# Patient Record
Sex: FEMALE | Race: WHITE | Employment: OTHER | URBAN - METROPOLITAN AREA
[De-identification: names, ages, dates, MRNs, and addresses within clinical notes are randomized per-mention and may not be internally consistent; named-entity substitution may affect disease eponyms.]

---

## 2020-03-27 ENCOUNTER — TELEPHONE (OUTPATIENT)
Dept: CARDIOLOGY CLINIC | Age: 70
End: 2020-03-27

## 2020-03-27 NOTE — TELEPHONE ENCOUNTER
Pt is scheduled at the [facility] office 5/12/2020 to see Dr. Katiuska Escobar.    Niece would like for her to be seen sooner, she has no immediate problems. She has no access to cell phones, laptop, however she is calling today from her mother's cell phone. Anyway the patient would like to be seen in the office in [place]. I explained that we are not seeing patients in the office, but the niece is insisting that she be seen soon in the office. Please call sushant to figure out the best way for patient to be seen.     Thanks

## 2020-03-27 NOTE — TELEPHONE ENCOUNTER
Spoke to patient using 2 identifiers. Patient gave verbal permission to me to speak with her niece, Jackie Bunn. She wanted to know if patient can be seen sooner than appt date to establish care. Does not have any current urgent matter to be addressed. It was explained to her that d/t the coronavirus, our clinic is seeing patients via virtual visits. We are limiting patient's we see in the clinic on an urgent basis or if deemed necessary by providers. Since neither patient or niece has the means for virtual visits, she was informed to keep her appt for now on 5/12/20 with Dr. Dilma Euceda since current situation may be subject to change. She verbalized understanding.

## 2020-06-08 RX ORDER — METOPROLOL SUCCINATE 50 MG/1
50 TABLET, EXTENDED RELEASE ORAL DAILY
Status: ON HOLD | COMMUNITY
End: 2022-01-01

## 2020-06-08 RX ORDER — GLIPIZIDE 10 MG/1
10 TABLET, FILM COATED, EXTENDED RELEASE ORAL DAILY
Status: ON HOLD | COMMUNITY
End: 2022-01-01

## 2020-06-08 NOTE — PROGRESS NOTES
1. Have you been to the ER, urgent care clinic since your last visit? Hospitalized since your last visit? No    2. Have you seen or consulted any other health care providers outside of the [address] [place] [address] since your last visit? Include any pap smears or colon screening. No    Chief Complaint   Patient presents with    Chest Pain     NP, ref by Dr. Carloz De Guzman, have records. C/o CP rest and exertion . C/O lightheadedness.

## 2020-06-08 NOTE — PROGRESS NOTES
Subjective/HPI:     Prince Cox is a 68 y.o. female is here for new patient consultation. The patient has medical hx significant for HTN, Hypercholesterolemia, DM, and AF. The pt presents with c/o CP. Symptoms started 3 years ago and progressed to up to 3x/day on avg. It is not occurring daily. She will rest and it will resolve on its own. She denies other associated symptoms or noted triggers. Can occur during the day or at night. She also has episodes of lightheadedness that occurs when she has been standing for some time. She denies fatigue, diaphoresis, fatigue, nausea, or SALAS/SOB. She has hx of former tobacco use. Previous cardiac evaluation includes echo in 2009 with NL EF and no valvular disease. Family med hx significant for DM, cancer, CAD. Patient Active Problem List    Diagnosis Date Noted    Hypercholesteremia 07/15/2014    HTN (hypertension) 07/15/2014    Atrial fibrillation ([facility] 07/15/2014    Diabetes type 2, controlled ([facility] [facility] 07/15/2014      Mehnaz Castaneda MD  Past Medical History:   Diagnosis Date    Diabetes ([address]     Hypercholesterolemia     Hypertension       Past Surgical History:   Procedure Laterality Date    HX GYN      HX HYSTERECTOMY N/A      No Known Allergies   Family History   Problem Relation Age of Onset    Cancer Mother     Diabetes Mother     Heart Disease Mother     Diabetes Sister     Diabetes Sister     Diabetes Sister     Diabetes Sister     Cancer Sister       Current Outpatient Medications   Medication Sig    glipiZIDE SR (GLUCOTROL XL) 10 mg CR tablet Take 10 mg by mouth daily.  metoprolol succinate (TOPROL-XL) 50 mg XL tablet Take 50 mg by mouth daily.  dulaglutide (TRULICITY) 1.5 LI/0.5 mL sub-q pen 1.5 mg by SubCUTAneous route every seven (7) days.  metformin (GLUCOPHAGE) 500 mg tablet Take 1 tablet by mouth two (2) times daily (with meals).     glucose blood VI test strips (TeburuIA CONTOUR) strip Check blood glucose 2 times a day    lisinopril (PRINIVIL, ZESTRIL) 10 mg tablet Take 1 tablet by mouth daily.  Lancets (LANCETS,ULTRA THIN) misc Check glucose 2 times a day    simvastatin (ZOCOR) 20 mg tablet Take 1 Tab by mouth nightly.  ofloxacin (FLOXIN) 0.3 % otic solution Administer 5 Drops into each ear daily.  aspirin 81 mg chewable tablet Take 81 mg by mouth daily. No current facility-administered medications for this visit. Vitals:    06/09/20 1019 06/09/20 1034 06/09/20 1035   BP: 170/90 146/80 160/88   Pulse: 78     Resp: 18     Weight: 152 lb 4.8 oz (69.1 kg)     Height: 5' 0.5\" (1.537 m)         I have reviewed the nurses notes, vitals, problem list, allergy list, medical history, family, social history and medications. Review of Symptoms:  General: Pt denies excessive weight gain or loss. Pt is able to conduct ADL's  HEENT: Denies blurred vision, headaches, epistaxis and difficulty swallowing. Respiratory: Denies shortness of breath, SALAS, wheezing or stridor. Cardiovascular: +precordial pain, denies palpitations, edema or PND  Gastrointestinal: Denies poor appetite, indigestion, abdominal pain or blood in stool  Urinary: Denies dysuria, pyuria  Musculoskeletal: Denies pain or swelling from muscles or joints  Neurologic: +lightheadedness Denies tremor, paresthesias, or sensory motor disturbance  Skin: Denies rash, itching or texture change. Psych: Denies depression        Physical Exam:      General: Well developed, cooperative, alert in no acute distress, appears states age. HEENT: Supple, No carotid bruits, no JVD, trach is midline. PERRL, EOM intact  Heart:  Normal S1/S2 negative S3 or S4. Regular,+KANDICE, no gallop or rub. Respiratory: Clear bilaterally x 4, no wheezing or rales  Abdomen:   Soft, non-tender, no masses, bowel sounds are active. Extremities:  No edema, normal cap refill, no cyanosis, atraumatic. Neuro: A&Ox3, speech clear, gait stable. Skin: Skin color is normal. No rashes or lesions.  Non diaphoretic  Vascular: 2+ pulses symmetric in all extremities    Cardiographics    ECG: Sinus  Rhythm   -Poor R-wave progression         Cardiology Labs:    Lab Results   Component Value Date/Time    Cholesterol, total 216 (H) 07/15/2014 02:50 PM    HDL Cholesterol 70 07/15/2014 02:50 PM    LDL, calculated 96 07/15/2014 02:50 PM    LDL, calculated 117.2 (H) 08/13/2009 04:00 AM    VLDL, calculated 50 (H) 07/15/2014 02:50 PM    CHOL/HDL Ratio 3.8 08/13/2009 04:00 AM     Lab Results   Component Value Date/Time    Sodium 141 07/15/2014 02:50 PM    Potassium 5.0 07/15/2014 02:50 PM    Chloride 102 07/15/2014 02:50 PM    CO2 24 07/15/2014 02:50 PM    Glucose 157 (H) 07/15/2014 02:50 PM    BUN 18 07/15/2014 02:50 PM    Creatinine 0.78 07/15/2014 02:50 PM    BUN/Creatinine ratio 23 07/15/2014 02:50 PM    GFR est AA 92 07/15/2014 02:50 PM    GFR est non-AA 80 07/15/2014 02:50 PM    Calcium 9.9 07/15/2014 02:50 PM    Anion gap 10 08/14/2009 04:15 AM    Bilirubin, total 0.3 07/15/2014 02:50 PM    ALT (SGPT) 16 07/15/2014 02:50 PM    Alk. phosphatase 88 07/15/2014 02:50 PM    Protein, total 7.3 07/15/2014 02:50 PM    Albumin 4.9 (H) 07/15/2014 02:50 PM    Globulin 3.7 08/12/2009 11:55 AM    A-G Ratio 2.0 07/15/2014 02:50 PM     Lab Results   Component Value Date/Time    Hemoglobin A1c 9.4 (H) 08/13/2009 04:00 AM    Hemoglobin A1c (POC) 7.1% 07/15/2014 02:50 PM        Assessment:     Assessment:      Diagnoses and all orders for this visit:    1. Precordial pain  -     NUCLEAR CARDIAC STRESS TEST  -     ECHO ADULT COMPLETE; Future    2. Essential hypertension  -     NUCLEAR CARDIAC STRESS TEST  -     ECHO ADULT COMPLETE; Future    3. Hypercholesteremia  -     NUCLEAR CARDIAC STRESS TEST  -     ECHO ADULT COMPLETE; Future    4. Controlled type 2 diabetes mellitus without complication, unspecified whether long term insulin use (HCC)  -     NUCLEAR CARDIAC STRESS TEST  -     ECHO ADULT COMPLETE; Future    5.  Chest pain, unspecified type  -     AMB POC EKG ROUTINE W/ 12 LEADS, INTER & REP  -     NUCLEAR CARDIAC STRESS TEST  -     ECHO ADULT COMPLETE; Future      Specialty Problems        Cardiology Problems    Atrial fibrillation ([address]        Diabetes type 2, controlled ([address]        HTN (hypertension)        Hypercholesteremia              ICD-10-CM ICD-9-CM    1. Precordial pain R07.2 786.51 NUCLEAR CARDIAC STRESS TEST      ECHO ADULT COMPLETE   2. Essential hypertension I10 401.9 NUCLEAR CARDIAC STRESS TEST      ECHO ADULT COMPLETE   3. Hypercholesteremia E78.00 272.0 NUCLEAR CARDIAC STRESS TEST      ECHO ADULT COMPLETE   4. Controlled type 2 diabetes mellitus without complication, unspecified whether long term insulin use (HCC) E11.9 250.00 NUCLEAR CARDIAC STRESS TEST      ECHO ADULT COMPLETE   5. Chest pain, unspecified type R07.9 786.50 AMB POC EKG ROUTINE W/ 12 LEADS, INTER & REP      NUCLEAR CARDIAC STRESS TEST      ECHO ADULT COMPLETE         PLAN:  1. Precordial pain  Episodes of CP that resolve with rest. Evaluate with lexiscan stress test as she is not able to walk on treadmill. 2. Essential hypertension  Labile in PCP office, elevated today. Cont to monitor during testing. 3. Hypercholesteremia  , HDL 62, Trig 201. Not taking statin. Recommend take high intensity statin    4. Uncontrolled type 2 diabetes mellitus  Managed by PCP. Has not been well controlled. F/U dependent on results.       Leighann Ortiz MD

## 2020-06-09 ENCOUNTER — OFFICE VISIT (OUTPATIENT)
Dept: CARDIOLOGY CLINIC | Age: 70
End: 2020-06-09

## 2020-06-09 VITALS
DIASTOLIC BLOOD PRESSURE: 88 MMHG | SYSTOLIC BLOOD PRESSURE: 160 MMHG | BODY MASS INDEX: 28.75 KG/M2 | RESPIRATION RATE: 18 BRPM | HEIGHT: 61 IN | HEART RATE: 78 BPM | WEIGHT: 152.3 LBS

## 2020-06-09 DIAGNOSIS — R07.2 PRECORDIAL PAIN: Primary | ICD-10-CM

## 2020-06-09 DIAGNOSIS — E78.00 HYPERCHOLESTEREMIA: ICD-10-CM

## 2020-06-09 DIAGNOSIS — E11.9 CONTROLLED TYPE 2 DIABETES MELLITUS WITHOUT COMPLICATION, UNSPECIFIED WHETHER LONG TERM INSULIN USE (HCC): ICD-10-CM

## 2020-06-09 DIAGNOSIS — I10 ESSENTIAL HYPERTENSION: ICD-10-CM

## 2020-06-09 DIAGNOSIS — R07.9 CHEST PAIN, UNSPECIFIED TYPE: ICD-10-CM

## 2020-06-09 NOTE — PROGRESS NOTES
1. Have you been to the ER, urgent care clinic since your last visit? Hospitalized since your last visit? No    2. Have you seen or consulted any other health care providers outside of the [address] [place] [address] since your last visit? Include any pap smears or colon screening. No    Chief Complaint   Patient presents with    Chest Pain     NP, ref by Dr. Darylene Carpen, have records. C/o CP rest and exertion . C/O lightheadedness.

## 2020-07-14 ENCOUNTER — TELEPHONE (OUTPATIENT)
Dept: CARDIOLOGY CLINIC | Age: 70
End: 2020-07-14

## 2020-07-14 LAB
STRESS BASELINE DIAS BP: 84 MMHG
STRESS BASELINE HR: 97 BPM
STRESS BASELINE SYS BP: 132 MMHG
STRESS PEAK DIAS BP: 84 MMHG
STRESS PEAK SYS BP: 132 MMHG
STRESS PERCENT HR ACHIEVED: 71 %
STRESS POST PEAK HR: 106 BPM
STRESS RATE PRESSURE PRODUCT: NORMAL BPM*MMHG
STRESS ST DEPRESSION: 0 MM
STRESS ST ELEVATION: 0 MM
STRESS TARGET HR: 149 BPM

## 2020-07-14 NOTE — PROGRESS NOTES
Please let pt know her stress test is negative for signs of blood flow blockages.  She can f/u with Dr Bernadette Simpson if still having symptoms to discuss

## 2020-07-14 NOTE — TELEPHONE ENCOUNTER
Spoke with patient. Verified patient with two patient identifiers. Advised EST normal.  States she is not having symptoms now. Will call back later if she wants appt. Patient verbalized understanding.

## 2020-07-14 NOTE — TELEPHONE ENCOUNTER
----- Message from Manuel Monahan NP sent at 7/14/2020  1:53 PM EDT -----  Please let pt know her stress test is negative for signs of blood flow blockages.  She can f/u with Dr Yumiko Avilez if still having symptoms to discuss

## 2022-01-01 ENCOUNTER — APPOINTMENT (OUTPATIENT)
Dept: GENERAL RADIOLOGY | Age: 72
DRG: 177 | End: 2022-01-01
Attending: EMERGENCY MEDICINE
Payer: MEDICARE

## 2022-01-01 ENCOUNTER — APPOINTMENT (OUTPATIENT)
Dept: GENERAL RADIOLOGY | Age: 72
DRG: 177 | End: 2022-01-01
Attending: INTERNAL MEDICINE
Payer: MEDICARE

## 2022-01-01 ENCOUNTER — APPOINTMENT (OUTPATIENT)
Dept: CT IMAGING | Age: 72
DRG: 177 | End: 2022-01-01
Attending: EMERGENCY MEDICINE
Payer: MEDICARE

## 2022-01-01 ENCOUNTER — APPOINTMENT (OUTPATIENT)
Dept: GENERAL RADIOLOGY | Age: 72
End: 2022-01-01
Attending: EMERGENCY MEDICINE
Payer: MEDICARE

## 2022-01-01 ENCOUNTER — HOSPITAL ENCOUNTER (EMERGENCY)
Age: 72
Discharge: HOME | End: 2022-02-23
Attending: EMERGENCY MEDICINE
Payer: MEDICARE

## 2022-01-01 ENCOUNTER — APPOINTMENT (OUTPATIENT)
Dept: CT IMAGING | Age: 72
End: 2022-01-01
Attending: EMERGENCY MEDICINE
Payer: MEDICARE

## 2022-01-01 ENCOUNTER — HOSPITAL ENCOUNTER (INPATIENT)
Age: 72
LOS: 15 days | DRG: 177 | End: 2022-03-16
Attending: EMERGENCY MEDICINE | Admitting: INTERNAL MEDICINE
Payer: MEDICARE

## 2022-01-01 VITALS
RESPIRATION RATE: 24 BRPM | HEART RATE: 87 BPM | SYSTOLIC BLOOD PRESSURE: 154 MMHG | WEIGHT: 148.99 LBS | DIASTOLIC BLOOD PRESSURE: 102 MMHG | BODY MASS INDEX: 29.1 KG/M2 | TEMPERATURE: 99.2 F | OXYGEN SATURATION: 93 %

## 2022-01-01 VITALS
DIASTOLIC BLOOD PRESSURE: 65 MMHG | HEART RATE: 81 BPM | WEIGHT: 137.1 LBS | SYSTOLIC BLOOD PRESSURE: 116 MMHG | TEMPERATURE: 98.3 F | BODY MASS INDEX: 26.92 KG/M2 | HEIGHT: 60 IN | OXYGEN SATURATION: 67 % | RESPIRATION RATE: 22 BRPM

## 2022-01-01 DIAGNOSIS — J96.01 ACUTE RESPIRATORY FAILURE WITH HYPOXIA (HCC): Primary | ICD-10-CM

## 2022-01-01 DIAGNOSIS — R05.9 COUGH: Primary | ICD-10-CM

## 2022-01-01 LAB
ALBUMIN SERPL-MCNC: 2.6 G/DL (ref 3.5–5)
ALBUMIN SERPL-MCNC: 3.5 G/DL (ref 3.5–5)
ALBUMIN/GLOB SERPL: 0.5 {RATIO} (ref 1.1–2.2)
ALBUMIN/GLOB SERPL: 0.8 {RATIO} (ref 1.1–2.2)
ALP SERPL-CCNC: 120 U/L (ref 45–117)
ALP SERPL-CCNC: 85 U/L (ref 45–117)
ALT SERPL-CCNC: 18 U/L (ref 12–78)
ALT SERPL-CCNC: 25 U/L (ref 12–78)
ANION GAP SERPL CALC-SCNC: 0 MMOL/L (ref 5–15)
ANION GAP SERPL CALC-SCNC: 10 MMOL/L (ref 5–15)
ANION GAP SERPL CALC-SCNC: 10 MMOL/L (ref 5–15)
ANION GAP SERPL CALC-SCNC: 3 MMOL/L (ref 5–15)
ANION GAP SERPL CALC-SCNC: 3 MMOL/L (ref 5–15)
ANION GAP SERPL CALC-SCNC: 4 MMOL/L (ref 5–15)
ANION GAP SERPL CALC-SCNC: 4 MMOL/L (ref 5–15)
ANION GAP SERPL CALC-SCNC: 5 MMOL/L (ref 5–15)
ANION GAP SERPL CALC-SCNC: 5 MMOL/L (ref 5–15)
ANION GAP SERPL CALC-SCNC: 6 MMOL/L (ref 5–15)
ANION GAP SERPL CALC-SCNC: 9 MMOL/L (ref 5–15)
APPEARANCE UR: CLEAR
AST SERPL-CCNC: 26 U/L (ref 15–37)
AST SERPL-CCNC: 33 U/L (ref 15–37)
ATRIAL RATE: 81 BPM
ATRIAL RATE: 82 BPM
BACTERIA URNS QL MICRO: ABNORMAL /HPF
BASOPHILS # BLD: 0 K/UL (ref 0–0.1)
BASOPHILS # BLD: 0 K/UL (ref 0–0.1)
BASOPHILS NFR BLD: 0 % (ref 0–1)
BASOPHILS NFR BLD: 0 % (ref 0–1)
BILIRUB SERPL-MCNC: 0.6 MG/DL (ref 0.2–1)
BILIRUB SERPL-MCNC: 0.7 MG/DL (ref 0.2–1)
BILIRUB UR QL CFM: NEGATIVE
BNP SERPL-MCNC: 197 PG/ML
BNP SERPL-MCNC: 538 PG/ML
BUN SERPL-MCNC: 28 MG/DL (ref 6–20)
BUN SERPL-MCNC: 30 MG/DL (ref 6–20)
BUN SERPL-MCNC: 31 MG/DL (ref 6–20)
BUN SERPL-MCNC: 32 MG/DL (ref 6–20)
BUN SERPL-MCNC: 32 MG/DL (ref 6–20)
BUN SERPL-MCNC: 35 MG/DL (ref 6–20)
BUN SERPL-MCNC: 36 MG/DL (ref 6–20)
BUN SERPL-MCNC: 36 MG/DL (ref 6–20)
BUN/CREAT SERPL: 31 (ref 12–20)
BUN/CREAT SERPL: 44 (ref 12–20)
BUN/CREAT SERPL: 49 (ref 12–20)
BUN/CREAT SERPL: 50 (ref 12–20)
BUN/CREAT SERPL: 52 (ref 12–20)
BUN/CREAT SERPL: 52 (ref 12–20)
BUN/CREAT SERPL: 53 (ref 12–20)
BUN/CREAT SERPL: 53 (ref 12–20)
BUN/CREAT SERPL: 55 (ref 12–20)
BUN/CREAT SERPL: 56 (ref 12–20)
BUN/CREAT SERPL: 56 (ref 12–20)
BUN/CREAT SERPL: 57 (ref 12–20)
BUN/CREAT SERPL: 57 (ref 12–20)
BUN/CREAT SERPL: 62 (ref 12–20)
CALCIUM SERPL-MCNC: 8.2 MG/DL (ref 8.5–10.1)
CALCIUM SERPL-MCNC: 8.3 MG/DL (ref 8.5–10.1)
CALCIUM SERPL-MCNC: 8.4 MG/DL (ref 8.5–10.1)
CALCIUM SERPL-MCNC: 8.5 MG/DL (ref 8.5–10.1)
CALCIUM SERPL-MCNC: 8.5 MG/DL (ref 8.5–10.1)
CALCIUM SERPL-MCNC: 8.6 MG/DL (ref 8.5–10.1)
CALCIUM SERPL-MCNC: 8.7 MG/DL (ref 8.5–10.1)
CALCIUM SERPL-MCNC: 8.8 MG/DL (ref 8.5–10.1)
CALCIUM SERPL-MCNC: 9.1 MG/DL (ref 8.5–10.1)
CALCIUM SERPL-MCNC: 9.2 MG/DL (ref 8.5–10.1)
CALCIUM SERPL-MCNC: 9.2 MG/DL (ref 8.5–10.1)
CALCIUM SERPL-MCNC: 9.3 MG/DL (ref 8.5–10.1)
CALCULATED P AXIS, ECG09: 22 DEGREES
CALCULATED P AXIS, ECG09: 46 DEGREES
CALCULATED R AXIS, ECG10: -31 DEGREES
CALCULATED R AXIS, ECG10: -38 DEGREES
CALCULATED T AXIS, ECG11: 37 DEGREES
CALCULATED T AXIS, ECG11: 72 DEGREES
CHLORIDE SERPL-SCNC: 102 MMOL/L (ref 97–108)
CHLORIDE SERPL-SCNC: 103 MMOL/L (ref 97–108)
CHLORIDE SERPL-SCNC: 104 MMOL/L (ref 97–108)
CHLORIDE SERPL-SCNC: 104 MMOL/L (ref 97–108)
CHLORIDE SERPL-SCNC: 105 MMOL/L (ref 97–108)
CHLORIDE SERPL-SCNC: 106 MMOL/L (ref 97–108)
CHLORIDE SERPL-SCNC: 106 MMOL/L (ref 97–108)
CHLORIDE SERPL-SCNC: 98 MMOL/L (ref 97–108)
CO2 SERPL-SCNC: 24 MMOL/L (ref 21–32)
CO2 SERPL-SCNC: 26 MMOL/L (ref 21–32)
CO2 SERPL-SCNC: 27 MMOL/L (ref 21–32)
CO2 SERPL-SCNC: 28 MMOL/L (ref 21–32)
CO2 SERPL-SCNC: 28 MMOL/L (ref 21–32)
CO2 SERPL-SCNC: 29 MMOL/L (ref 21–32)
CO2 SERPL-SCNC: 32 MMOL/L (ref 21–32)
COLOR UR: ABNORMAL
COVID-19 RAPID TEST, COVR: DETECTED
CREAT SERPL-MCNC: 0.53 MG/DL (ref 0.55–1.02)
CREAT SERPL-MCNC: 0.54 MG/DL (ref 0.55–1.02)
CREAT SERPL-MCNC: 0.55 MG/DL (ref 0.55–1.02)
CREAT SERPL-MCNC: 0.57 MG/DL (ref 0.55–1.02)
CREAT SERPL-MCNC: 0.58 MG/DL (ref 0.55–1.02)
CREAT SERPL-MCNC: 0.61 MG/DL (ref 0.55–1.02)
CREAT SERPL-MCNC: 0.61 MG/DL (ref 0.55–1.02)
CREAT SERPL-MCNC: 0.62 MG/DL (ref 0.55–1.02)
CREAT SERPL-MCNC: 0.63 MG/DL (ref 0.55–1.02)
CREAT SERPL-MCNC: 0.64 MG/DL (ref 0.55–1.02)
CREAT SERPL-MCNC: 0.73 MG/DL (ref 0.55–1.02)
CREAT SERPL-MCNC: 0.91 MG/DL (ref 0.55–1.02)
DIAGNOSIS, 93000: NORMAL
DIAGNOSIS, 93000: NORMAL
DIFFERENTIAL METHOD BLD: ABNORMAL
DIFFERENTIAL METHOD BLD: NORMAL
EOSINOPHIL # BLD: 0 K/UL (ref 0–0.4)
EOSINOPHIL # BLD: 0 K/UL (ref 0–0.4)
EOSINOPHIL NFR BLD: 0 % (ref 0–7)
EOSINOPHIL NFR BLD: 0 % (ref 0–7)
EPITH CASTS URNS QL MICRO: ABNORMAL /LPF
ERYTHROCYTE [DISTWIDTH] IN BLOOD BY AUTOMATED COUNT: 11.6 % (ref 11.5–14.5)
ERYTHROCYTE [DISTWIDTH] IN BLOOD BY AUTOMATED COUNT: 11.9 % (ref 11.5–14.5)
ERYTHROCYTE [DISTWIDTH] IN BLOOD BY AUTOMATED COUNT: 12 % (ref 11.5–14.5)
ERYTHROCYTE [DISTWIDTH] IN BLOOD BY AUTOMATED COUNT: 12.1 % (ref 11.5–14.5)
ERYTHROCYTE [DISTWIDTH] IN BLOOD BY AUTOMATED COUNT: 12.2 % (ref 11.5–14.5)
EST. AVERAGE GLUCOSE BLD GHB EST-MCNC: 295 MG/DL
GLOBULIN SER CALC-MCNC: 4.5 G/DL (ref 2–4)
GLOBULIN SER CALC-MCNC: 5 G/DL (ref 2–4)
GLUCOSE BLD STRIP.AUTO-MCNC: 102 MG/DL (ref 65–117)
GLUCOSE BLD STRIP.AUTO-MCNC: 104 MG/DL (ref 65–117)
GLUCOSE BLD STRIP.AUTO-MCNC: 112 MG/DL (ref 65–117)
GLUCOSE BLD STRIP.AUTO-MCNC: 117 MG/DL (ref 65–117)
GLUCOSE BLD STRIP.AUTO-MCNC: 120 MG/DL (ref 65–117)
GLUCOSE BLD STRIP.AUTO-MCNC: 126 MG/DL (ref 65–117)
GLUCOSE BLD STRIP.AUTO-MCNC: 127 MG/DL (ref 65–117)
GLUCOSE BLD STRIP.AUTO-MCNC: 133 MG/DL (ref 65–117)
GLUCOSE BLD STRIP.AUTO-MCNC: 134 MG/DL (ref 65–117)
GLUCOSE BLD STRIP.AUTO-MCNC: 137 MG/DL (ref 65–117)
GLUCOSE BLD STRIP.AUTO-MCNC: 141 MG/DL (ref 65–117)
GLUCOSE BLD STRIP.AUTO-MCNC: 146 MG/DL (ref 65–117)
GLUCOSE BLD STRIP.AUTO-MCNC: 153 MG/DL (ref 65–117)
GLUCOSE BLD STRIP.AUTO-MCNC: 157 MG/DL (ref 65–117)
GLUCOSE BLD STRIP.AUTO-MCNC: 180 MG/DL (ref 65–117)
GLUCOSE BLD STRIP.AUTO-MCNC: 183 MG/DL (ref 65–117)
GLUCOSE BLD STRIP.AUTO-MCNC: 185 MG/DL (ref 65–117)
GLUCOSE BLD STRIP.AUTO-MCNC: 191 MG/DL (ref 65–117)
GLUCOSE BLD STRIP.AUTO-MCNC: 193 MG/DL (ref 65–117)
GLUCOSE BLD STRIP.AUTO-MCNC: 199 MG/DL (ref 65–117)
GLUCOSE BLD STRIP.AUTO-MCNC: 201 MG/DL (ref 65–117)
GLUCOSE BLD STRIP.AUTO-MCNC: 203 MG/DL (ref 65–117)
GLUCOSE BLD STRIP.AUTO-MCNC: 205 MG/DL (ref 65–117)
GLUCOSE BLD STRIP.AUTO-MCNC: 207 MG/DL (ref 65–117)
GLUCOSE BLD STRIP.AUTO-MCNC: 210 MG/DL (ref 65–117)
GLUCOSE BLD STRIP.AUTO-MCNC: 211 MG/DL (ref 65–117)
GLUCOSE BLD STRIP.AUTO-MCNC: 213 MG/DL (ref 65–117)
GLUCOSE BLD STRIP.AUTO-MCNC: 219 MG/DL (ref 65–117)
GLUCOSE BLD STRIP.AUTO-MCNC: 219 MG/DL (ref 65–117)
GLUCOSE BLD STRIP.AUTO-MCNC: 239 MG/DL (ref 65–117)
GLUCOSE BLD STRIP.AUTO-MCNC: 246 MG/DL (ref 65–117)
GLUCOSE BLD STRIP.AUTO-MCNC: 262 MG/DL (ref 65–117)
GLUCOSE BLD STRIP.AUTO-MCNC: 270 MG/DL (ref 65–117)
GLUCOSE BLD STRIP.AUTO-MCNC: 273 MG/DL (ref 65–117)
GLUCOSE BLD STRIP.AUTO-MCNC: 276 MG/DL (ref 65–117)
GLUCOSE BLD STRIP.AUTO-MCNC: 281 MG/DL (ref 65–117)
GLUCOSE BLD STRIP.AUTO-MCNC: 284 MG/DL (ref 65–117)
GLUCOSE BLD STRIP.AUTO-MCNC: 285 MG/DL (ref 65–117)
GLUCOSE BLD STRIP.AUTO-MCNC: 293 MG/DL (ref 65–117)
GLUCOSE BLD STRIP.AUTO-MCNC: 295 MG/DL (ref 65–117)
GLUCOSE BLD STRIP.AUTO-MCNC: 299 MG/DL (ref 65–117)
GLUCOSE BLD STRIP.AUTO-MCNC: 306 MG/DL (ref 65–117)
GLUCOSE BLD STRIP.AUTO-MCNC: 306 MG/DL (ref 65–117)
GLUCOSE BLD STRIP.AUTO-MCNC: 318 MG/DL (ref 65–117)
GLUCOSE BLD STRIP.AUTO-MCNC: 319 MG/DL (ref 65–117)
GLUCOSE BLD STRIP.AUTO-MCNC: 328 MG/DL (ref 65–117)
GLUCOSE BLD STRIP.AUTO-MCNC: 367 MG/DL (ref 65–117)
GLUCOSE BLD STRIP.AUTO-MCNC: 378 MG/DL (ref 65–117)
GLUCOSE BLD STRIP.AUTO-MCNC: 379 MG/DL (ref 65–117)
GLUCOSE BLD STRIP.AUTO-MCNC: 394 MG/DL (ref 65–117)
GLUCOSE BLD STRIP.AUTO-MCNC: 54 MG/DL (ref 65–117)
GLUCOSE BLD STRIP.AUTO-MCNC: 560 MG/DL (ref 65–117)
GLUCOSE BLD STRIP.AUTO-MCNC: 59 MG/DL (ref 65–117)
GLUCOSE BLD STRIP.AUTO-MCNC: 60 MG/DL (ref 65–117)
GLUCOSE BLD STRIP.AUTO-MCNC: 74 MG/DL (ref 65–117)
GLUCOSE BLD STRIP.AUTO-MCNC: 76 MG/DL (ref 65–117)
GLUCOSE BLD STRIP.AUTO-MCNC: 81 MG/DL (ref 65–117)
GLUCOSE BLD STRIP.AUTO-MCNC: 84 MG/DL (ref 65–117)
GLUCOSE BLD STRIP.AUTO-MCNC: 87 MG/DL (ref 65–117)
GLUCOSE BLD STRIP.AUTO-MCNC: 91 MG/DL (ref 65–117)
GLUCOSE BLD STRIP.AUTO-MCNC: 95 MG/DL (ref 65–117)
GLUCOSE BLD STRIP.AUTO-MCNC: 99 MG/DL (ref 65–117)
GLUCOSE BLD STRIP.AUTO-MCNC: 99 MG/DL (ref 65–117)
GLUCOSE SERPL-MCNC: 102 MG/DL (ref 65–100)
GLUCOSE SERPL-MCNC: 121 MG/DL (ref 65–100)
GLUCOSE SERPL-MCNC: 128 MG/DL (ref 65–100)
GLUCOSE SERPL-MCNC: 140 MG/DL (ref 65–100)
GLUCOSE SERPL-MCNC: 169 MG/DL (ref 65–100)
GLUCOSE SERPL-MCNC: 179 MG/DL (ref 65–100)
GLUCOSE SERPL-MCNC: 182 MG/DL (ref 65–100)
GLUCOSE SERPL-MCNC: 187 MG/DL (ref 65–100)
GLUCOSE SERPL-MCNC: 236 MG/DL (ref 65–100)
GLUCOSE SERPL-MCNC: 317 MG/DL (ref 65–100)
GLUCOSE SERPL-MCNC: 382 MG/DL (ref 65–100)
GLUCOSE SERPL-MCNC: 53 MG/DL (ref 65–100)
GLUCOSE SERPL-MCNC: 55 MG/DL (ref 65–100)
GLUCOSE SERPL-MCNC: 63 MG/DL (ref 65–100)
GLUCOSE UR STRIP.AUTO-MCNC: >1000 MG/DL
HBA1C MFR BLD: 11.9 % (ref 4–5.6)
HCT VFR BLD AUTO: 35.2 % (ref 35–47)
HCT VFR BLD AUTO: 35.7 % (ref 35–47)
HCT VFR BLD AUTO: 35.9 % (ref 35–47)
HCT VFR BLD AUTO: 36.3 % (ref 35–47)
HCT VFR BLD AUTO: 36.4 % (ref 35–47)
HCT VFR BLD AUTO: 36.6 % (ref 35–47)
HCT VFR BLD AUTO: 36.7 % (ref 35–47)
HCT VFR BLD AUTO: 37.1 % (ref 35–47)
HCT VFR BLD AUTO: 37.5 % (ref 35–47)
HCT VFR BLD AUTO: 38.4 % (ref 35–47)
HCT VFR BLD AUTO: 38.5 % (ref 35–47)
HCT VFR BLD AUTO: 38.9 % (ref 35–47)
HCT VFR BLD AUTO: 39.5 % (ref 35–47)
HCT VFR BLD AUTO: 42.5 % (ref 35–47)
HGB BLD-MCNC: 11.9 G/DL (ref 11.5–16)
HGB BLD-MCNC: 12.2 G/DL (ref 11.5–16)
HGB BLD-MCNC: 12.3 G/DL (ref 11.5–16)
HGB BLD-MCNC: 12.4 G/DL (ref 11.5–16)
HGB BLD-MCNC: 12.6 G/DL (ref 11.5–16)
HGB BLD-MCNC: 12.7 G/DL (ref 11.5–16)
HGB BLD-MCNC: 13 G/DL (ref 11.5–16)
HGB BLD-MCNC: 13.1 G/DL (ref 11.5–16)
HGB BLD-MCNC: 13.1 G/DL (ref 11.5–16)
HGB BLD-MCNC: 13.2 G/DL (ref 11.5–16)
HGB BLD-MCNC: 13.3 G/DL (ref 11.5–16)
HGB BLD-MCNC: 14.5 G/DL (ref 11.5–16)
HGB UR QL STRIP: NEGATIVE
IMM GRANULOCYTES # BLD AUTO: 0 K/UL (ref 0–0.04)
IMM GRANULOCYTES # BLD AUTO: 0.1 K/UL (ref 0–0.04)
IMM GRANULOCYTES NFR BLD AUTO: 0 % (ref 0–0.5)
IMM GRANULOCYTES NFR BLD AUTO: 2 % (ref 0–0.5)
KETONES UR QL STRIP.AUTO: 80 MG/DL
LEUKOCYTE ESTERASE UR QL STRIP.AUTO: ABNORMAL
LYMPHOCYTES # BLD: 0.5 K/UL (ref 0.8–3.5)
LYMPHOCYTES # BLD: 0.8 K/UL (ref 0.8–3.5)
LYMPHOCYTES NFR BLD: 20 % (ref 12–49)
LYMPHOCYTES NFR BLD: 7 % (ref 12–49)
MCH RBC QN AUTO: 28.3 PG (ref 26–34)
MCH RBC QN AUTO: 28.3 PG (ref 26–34)
MCH RBC QN AUTO: 28.5 PG (ref 26–34)
MCH RBC QN AUTO: 28.5 PG (ref 26–34)
MCH RBC QN AUTO: 28.6 PG (ref 26–34)
MCH RBC QN AUTO: 28.8 PG (ref 26–34)
MCH RBC QN AUTO: 28.9 PG (ref 26–34)
MCH RBC QN AUTO: 28.9 PG (ref 26–34)
MCH RBC QN AUTO: 29 PG (ref 26–34)
MCH RBC QN AUTO: 29 PG (ref 26–34)
MCH RBC QN AUTO: 29.2 PG (ref 26–34)
MCH RBC QN AUTO: 29.6 PG (ref 26–34)
MCHC RBC AUTO-ENTMCNC: 33.2 G/DL (ref 30–36.5)
MCHC RBC AUTO-ENTMCNC: 33.4 G/DL (ref 30–36.5)
MCHC RBC AUTO-ENTMCNC: 33.4 G/DL (ref 30–36.5)
MCHC RBC AUTO-ENTMCNC: 33.8 G/DL (ref 30–36.5)
MCHC RBC AUTO-ENTMCNC: 33.8 G/DL (ref 30–36.5)
MCHC RBC AUTO-ENTMCNC: 34.1 G/DL (ref 30–36.5)
MCHC RBC AUTO-ENTMCNC: 34.1 G/DL (ref 30–36.5)
MCHC RBC AUTO-ENTMCNC: 34.2 G/DL (ref 30–36.5)
MCHC RBC AUTO-ENTMCNC: 34.2 G/DL (ref 30–36.5)
MCHC RBC AUTO-ENTMCNC: 34.3 G/DL (ref 30–36.5)
MCHC RBC AUTO-ENTMCNC: 34.4 G/DL (ref 30–36.5)
MCHC RBC AUTO-ENTMCNC: 34.5 G/DL (ref 30–36.5)
MCHC RBC AUTO-ENTMCNC: 34.5 G/DL (ref 30–36.5)
MCHC RBC AUTO-ENTMCNC: 34.9 G/DL (ref 30–36.5)
MCV RBC AUTO: 83 FL (ref 80–99)
MCV RBC AUTO: 83.1 FL (ref 80–99)
MCV RBC AUTO: 83.2 FL (ref 80–99)
MCV RBC AUTO: 83.5 FL (ref 80–99)
MCV RBC AUTO: 83.6 FL (ref 80–99)
MCV RBC AUTO: 83.9 FL (ref 80–99)
MCV RBC AUTO: 84 FL (ref 80–99)
MCV RBC AUTO: 84.2 FL (ref 80–99)
MCV RBC AUTO: 84.6 FL (ref 80–99)
MCV RBC AUTO: 85.3 FL (ref 80–99)
MCV RBC AUTO: 85.6 FL (ref 80–99)
MCV RBC AUTO: 86.1 FL (ref 80–99)
MCV RBC AUTO: 86.7 FL (ref 80–99)
MCV RBC AUTO: 86.8 FL (ref 80–99)
MONOCYTES # BLD: 0.3 K/UL (ref 0–1)
MONOCYTES # BLD: 0.4 K/UL (ref 0–1)
MONOCYTES NFR BLD: 5 % (ref 5–13)
MONOCYTES NFR BLD: 9 % (ref 5–13)
NEUTS SEG # BLD: 2.8 K/UL (ref 1.8–8)
NEUTS SEG # BLD: 5.9 K/UL (ref 1.8–8)
NEUTS SEG NFR BLD: 71 % (ref 32–75)
NEUTS SEG NFR BLD: 86 % (ref 32–75)
NITRITE UR QL STRIP.AUTO: NEGATIVE
NRBC # BLD: 0 K/UL (ref 0–0.01)
NRBC BLD-RTO: 0 PER 100 WBC
P-R INTERVAL, ECG05: 172 MS
P-R INTERVAL, ECG05: 182 MS
PH UR STRIP: 5.5 [PH] (ref 5–8)
PLATELET # BLD AUTO: 194 K/UL (ref 150–400)
PLATELET # BLD AUTO: 235 K/UL (ref 150–400)
PLATELET # BLD AUTO: 265 K/UL (ref 150–400)
PLATELET # BLD AUTO: 266 K/UL (ref 150–400)
PLATELET # BLD AUTO: 273 K/UL (ref 150–400)
PLATELET # BLD AUTO: 283 K/UL (ref 150–400)
PLATELET # BLD AUTO: 292 K/UL (ref 150–400)
PLATELET # BLD AUTO: 293 K/UL (ref 150–400)
PLATELET # BLD AUTO: 348 K/UL (ref 150–400)
PLATELET # BLD AUTO: 350 K/UL (ref 150–400)
PLATELET # BLD AUTO: 367 K/UL (ref 150–400)
PLATELET # BLD AUTO: 396 K/UL (ref 150–400)
PLATELET # BLD AUTO: 412 K/UL (ref 150–400)
PLATELET # BLD AUTO: 413 K/UL (ref 150–400)
PMV BLD AUTO: 10 FL (ref 8.9–12.9)
PMV BLD AUTO: 10.1 FL (ref 8.9–12.9)
PMV BLD AUTO: 10.1 FL (ref 8.9–12.9)
PMV BLD AUTO: 9.1 FL (ref 8.9–12.9)
PMV BLD AUTO: 9.4 FL (ref 8.9–12.9)
PMV BLD AUTO: 9.5 FL (ref 8.9–12.9)
PMV BLD AUTO: 9.6 FL (ref 8.9–12.9)
PMV BLD AUTO: 9.6 FL (ref 8.9–12.9)
PMV BLD AUTO: 9.8 FL (ref 8.9–12.9)
PMV BLD AUTO: 9.9 FL (ref 8.9–12.9)
POTASSIUM SERPL-SCNC: 3.4 MMOL/L (ref 3.5–5.1)
POTASSIUM SERPL-SCNC: 3.6 MMOL/L (ref 3.5–5.1)
POTASSIUM SERPL-SCNC: 3.6 MMOL/L (ref 3.5–5.1)
POTASSIUM SERPL-SCNC: 4 MMOL/L (ref 3.5–5.1)
POTASSIUM SERPL-SCNC: 4 MMOL/L (ref 3.5–5.1)
POTASSIUM SERPL-SCNC: 4.2 MMOL/L (ref 3.5–5.1)
POTASSIUM SERPL-SCNC: 4.3 MMOL/L (ref 3.5–5.1)
POTASSIUM SERPL-SCNC: 4.4 MMOL/L (ref 3.5–5.1)
POTASSIUM SERPL-SCNC: 4.4 MMOL/L (ref 3.5–5.1)
POTASSIUM SERPL-SCNC: 4.6 MMOL/L (ref 3.5–5.1)
POTASSIUM SERPL-SCNC: 4.6 MMOL/L (ref 3.5–5.1)
POTASSIUM SERPL-SCNC: 4.7 MMOL/L (ref 3.5–5.1)
POTASSIUM SERPL-SCNC: 4.7 MMOL/L (ref 3.5–5.1)
POTASSIUM SERPL-SCNC: 4.8 MMOL/L (ref 3.5–5.1)
PROCALCITONIN SERPL-MCNC: 0.07 NG/ML
PROCALCITONIN SERPL-MCNC: 0.14 NG/ML
PROT SERPL-MCNC: 7.6 G/DL (ref 6.4–8.2)
PROT SERPL-MCNC: 8 G/DL (ref 6.4–8.2)
PROT UR STRIP-MCNC: 100 MG/DL
Q-T INTERVAL, ECG07: 360 MS
Q-T INTERVAL, ECG07: 372 MS
QRS DURATION, ECG06: 74 MS
QRS DURATION, ECG06: 84 MS
QTC CALCULATION (BEZET), ECG08: 420 MS
QTC CALCULATION (BEZET), ECG08: 432 MS
RBC # BLD AUTO: 4.21 M/UL (ref 3.8–5.2)
RBC # BLD AUTO: 4.23 M/UL (ref 3.8–5.2)
RBC # BLD AUTO: 4.25 M/UL (ref 3.8–5.2)
RBC # BLD AUTO: 4.25 M/UL (ref 3.8–5.2)
RBC # BLD AUTO: 4.31 M/UL (ref 3.8–5.2)
RBC # BLD AUTO: 4.32 M/UL (ref 3.8–5.2)
RBC # BLD AUTO: 4.35 M/UL (ref 3.8–5.2)
RBC # BLD AUTO: 4.4 M/UL (ref 3.8–5.2)
RBC # BLD AUTO: 4.52 M/UL (ref 3.8–5.2)
RBC # BLD AUTO: 4.59 M/UL (ref 3.8–5.2)
RBC # BLD AUTO: 4.59 M/UL (ref 3.8–5.2)
RBC # BLD AUTO: 4.6 M/UL (ref 3.8–5.2)
RBC # BLD AUTO: 4.6 M/UL (ref 3.8–5.2)
RBC # BLD AUTO: 4.9 M/UL (ref 3.8–5.2)
RBC #/AREA URNS HPF: ABNORMAL /HPF (ref 0–5)
RBC MORPH BLD: ABNORMAL
RBC MORPH BLD: NORMAL
SERVICE CMNT-IMP: ABNORMAL
SERVICE CMNT-IMP: NORMAL
SODIUM SERPL-SCNC: 133 MMOL/L (ref 136–145)
SODIUM SERPL-SCNC: 134 MMOL/L (ref 136–145)
SODIUM SERPL-SCNC: 135 MMOL/L (ref 136–145)
SODIUM SERPL-SCNC: 135 MMOL/L (ref 136–145)
SODIUM SERPL-SCNC: 136 MMOL/L (ref 136–145)
SODIUM SERPL-SCNC: 138 MMOL/L (ref 136–145)
SODIUM SERPL-SCNC: 138 MMOL/L (ref 136–145)
SODIUM SERPL-SCNC: 139 MMOL/L (ref 136–145)
SODIUM SERPL-SCNC: 139 MMOL/L (ref 136–145)
SODIUM SERPL-SCNC: 140 MMOL/L (ref 136–145)
SOURCE, COVRS: ABNORMAL
SP GR UR REFRACTOMETRY: >1.03 (ref 1–1.03)
TROPONIN-HIGH SENSITIVITY: 14 NG/L (ref 0–51)
TROPONIN-HIGH SENSITIVITY: 9 NG/L (ref 0–51)
UROBILINOGEN UR QL STRIP.AUTO: 1 EU/DL (ref 0.2–1)
VENTRICULAR RATE, ECG03: 81 BPM
VENTRICULAR RATE, ECG03: 82 BPM
WBC # BLD AUTO: 11.6 K/UL (ref 3.6–11)
WBC # BLD AUTO: 12.1 K/UL (ref 3.6–11)
WBC # BLD AUTO: 12.1 K/UL (ref 3.6–11)
WBC # BLD AUTO: 13.7 K/UL (ref 3.6–11)
WBC # BLD AUTO: 4 K/UL (ref 3.6–11)
WBC # BLD AUTO: 4.8 K/UL (ref 3.6–11)
WBC # BLD AUTO: 6.2 K/UL (ref 3.6–11)
WBC # BLD AUTO: 6.8 K/UL (ref 3.6–11)
WBC # BLD AUTO: 7.6 K/UL (ref 3.6–11)
WBC # BLD AUTO: 7.7 K/UL (ref 3.6–11)
WBC # BLD AUTO: 8.7 K/UL (ref 3.6–11)
WBC # BLD AUTO: 9.1 K/UL (ref 3.6–11)
WBC # BLD AUTO: 9.4 K/UL (ref 3.6–11)
WBC # BLD AUTO: 9.4 K/UL (ref 3.6–11)
WBC MORPH BLD: NORMAL
WBC URNS QL MICRO: ABNORMAL /HPF (ref 0–4)

## 2022-01-01 PROCEDURE — 74011250637 HC RX REV CODE- 250/637: Performed by: INTERNAL MEDICINE

## 2022-01-01 PROCEDURE — 94760 N-INVAS EAR/PLS OXIMETRY 1: CPT

## 2022-01-01 PROCEDURE — 74011000258 HC RX REV CODE- 258: Performed by: INTERNAL MEDICINE

## 2022-01-01 PROCEDURE — 82962 GLUCOSE BLOOD TEST: CPT

## 2022-01-01 PROCEDURE — 85027 COMPLETE CBC AUTOMATED: CPT

## 2022-01-01 PROCEDURE — 77010033678 HC OXYGEN DAILY

## 2022-01-01 PROCEDURE — 71045 X-RAY EXAM CHEST 1 VIEW: CPT

## 2022-01-01 PROCEDURE — 74011636637 HC RX REV CODE- 636/637: Performed by: NURSE PRACTITIONER

## 2022-01-01 PROCEDURE — 80048 BASIC METABOLIC PNL TOTAL CA: CPT

## 2022-01-01 PROCEDURE — 74011000250 HC RX REV CODE- 250: Performed by: INTERNAL MEDICINE

## 2022-01-01 PROCEDURE — 83036 HEMOGLOBIN GLYCOSYLATED A1C: CPT

## 2022-01-01 PROCEDURE — 36415 COLL VENOUS BLD VENIPUNCTURE: CPT

## 2022-01-01 PROCEDURE — 77010033711 HC HIGH FLOW OXYGEN

## 2022-01-01 PROCEDURE — 84484 ASSAY OF TROPONIN QUANT: CPT

## 2022-01-01 PROCEDURE — 74011250636 HC RX REV CODE- 250/636: Performed by: INTERNAL MEDICINE

## 2022-01-01 PROCEDURE — 74011636637 HC RX REV CODE- 636/637: Performed by: INTERNAL MEDICINE

## 2022-01-01 PROCEDURE — 5A09357 ASSISTANCE WITH RESPIRATORY VENTILATION, LESS THAN 24 CONSECUTIVE HOURS, CONTINUOUS POSITIVE AIRWAY PRESSURE: ICD-10-PCS | Performed by: INTERNAL MEDICINE

## 2022-01-01 PROCEDURE — 99285 EMERGENCY DEPT VISIT HI MDM: CPT

## 2022-01-01 PROCEDURE — 74011636637 HC RX REV CODE- 636/637: Performed by: EMERGENCY MEDICINE

## 2022-01-01 PROCEDURE — 81001 URINALYSIS AUTO W/SCOPE: CPT

## 2022-01-01 PROCEDURE — 99233 SBSQ HOSP IP/OBS HIGH 50: CPT | Performed by: NURSE PRACTITIONER

## 2022-01-01 PROCEDURE — 74011250636 HC RX REV CODE- 250/636: Performed by: NURSE PRACTITIONER

## 2022-01-01 PROCEDURE — 65660000000 HC RM CCU STEPDOWN

## 2022-01-01 PROCEDURE — 83880 ASSAY OF NATRIURETIC PEPTIDE: CPT

## 2022-01-01 PROCEDURE — 85025 COMPLETE CBC W/AUTO DIFF WBC: CPT

## 2022-01-01 PROCEDURE — 77010033710 HC HELIOX THERAPY DAILY

## 2022-01-01 PROCEDURE — XW0DXM6 INTRODUCTION OF BARICITINIB INTO MOUTH AND PHARYNX, EXTERNAL APPROACH, NEW TECHNOLOGY GROUP 6: ICD-10-PCS | Performed by: INTERNAL MEDICINE

## 2022-01-01 PROCEDURE — 71275 CT ANGIOGRAPHY CHEST: CPT

## 2022-01-01 PROCEDURE — 93005 ELECTROCARDIOGRAM TRACING: CPT

## 2022-01-01 PROCEDURE — 84145 PROCALCITONIN (PCT): CPT

## 2022-01-01 PROCEDURE — 74011250637 HC RX REV CODE- 250/637: Performed by: NURSE PRACTITIONER

## 2022-01-01 PROCEDURE — 87635 SARS-COV-2 COVID-19 AMP PRB: CPT

## 2022-01-01 PROCEDURE — 70450 CT HEAD/BRAIN W/O DYE: CPT

## 2022-01-01 PROCEDURE — 94640 AIRWAY INHALATION TREATMENT: CPT

## 2022-01-01 PROCEDURE — 94660 CPAP INITIATION&MGMT: CPT

## 2022-01-01 PROCEDURE — 80053 COMPREHEN METABOLIC PANEL: CPT

## 2022-01-01 PROCEDURE — 96374 THER/PROPH/DIAG INJ IV PUSH: CPT

## 2022-01-01 PROCEDURE — 99231 SBSQ HOSP IP/OBS SF/LOW 25: CPT | Performed by: NURSE PRACTITIONER

## 2022-01-01 PROCEDURE — 51798 US URINE CAPACITY MEASURE: CPT

## 2022-01-01 PROCEDURE — 74011250636 HC RX REV CODE- 250/636: Performed by: EMERGENCY MEDICINE

## 2022-01-01 PROCEDURE — 99223 1ST HOSP IP/OBS HIGH 75: CPT | Performed by: NURSE PRACTITIONER

## 2022-01-01 PROCEDURE — 74011000636 HC RX REV CODE- 636: Performed by: EMERGENCY MEDICINE

## 2022-01-01 RX ORDER — ENOXAPARIN SODIUM 100 MG/ML
40 INJECTION SUBCUTANEOUS DAILY
Status: DISCONTINUED | OUTPATIENT
Start: 2022-01-01 | End: 2022-01-01

## 2022-01-01 RX ORDER — DEXAMETHASONE 4 MG/1
6 TABLET ORAL DAILY
Status: DISCONTINUED | OUTPATIENT
Start: 2022-01-01 | End: 2022-01-01

## 2022-01-01 RX ORDER — ACETAMINOPHEN 325 MG/1
650 TABLET ORAL
Status: DISCONTINUED | OUTPATIENT
Start: 2022-01-01 | End: 2022-01-01 | Stop reason: SDUPTHER

## 2022-01-01 RX ORDER — INSULIN LISPRO 100 [IU]/ML
INJECTION, SOLUTION INTRAVENOUS; SUBCUTANEOUS
Status: DISCONTINUED | OUTPATIENT
Start: 2022-01-01 | End: 2022-01-01

## 2022-01-01 RX ORDER — MORPHINE SULFATE 2 MG/ML
1 INJECTION, SOLUTION INTRAMUSCULAR; INTRAVENOUS
Status: DISCONTINUED | OUTPATIENT
Start: 2022-01-01 | End: 2022-03-17 | Stop reason: HOSPADM

## 2022-01-01 RX ORDER — INSULIN GLARGINE 100 [IU]/ML
7 INJECTION, SOLUTION SUBCUTANEOUS DAILY
Status: DISCONTINUED | OUTPATIENT
Start: 2022-01-01 | End: 2022-01-01

## 2022-01-01 RX ORDER — BENZONATATE 100 MG/1
100 CAPSULE ORAL
Qty: 30 CAPSULE | Refills: 0 | Status: SHIPPED | OUTPATIENT
Start: 2022-01-01 | End: 2022-01-01

## 2022-01-01 RX ORDER — ATORVASTATIN CALCIUM 10 MG/1
10 TABLET, FILM COATED ORAL DAILY
Status: DISCONTINUED | OUTPATIENT
Start: 2022-01-01 | End: 2022-01-01

## 2022-01-01 RX ORDER — POLYETHYLENE GLYCOL 3350 17 G/17G
17 POWDER, FOR SOLUTION ORAL DAILY PRN
Status: DISCONTINUED | OUTPATIENT
Start: 2022-01-01 | End: 2022-01-01

## 2022-01-01 RX ORDER — ONDANSETRON 2 MG/ML
4 INJECTION INTRAMUSCULAR; INTRAVENOUS
Status: DISCONTINUED | OUTPATIENT
Start: 2022-01-01 | End: 2022-01-01

## 2022-01-01 RX ORDER — LISINOPRIL 10 MG/1
10 TABLET ORAL DAILY
Status: DISCONTINUED | OUTPATIENT
Start: 2022-01-01 | End: 2022-01-01

## 2022-01-01 RX ORDER — MORPHINE SULFATE 2 MG/ML
1 INJECTION, SOLUTION INTRAMUSCULAR; INTRAVENOUS
Status: DISCONTINUED | OUTPATIENT
Start: 2022-01-01 | End: 2022-01-01

## 2022-01-01 RX ORDER — INSULIN LISPRO 100 [IU]/ML
5 INJECTION, SOLUTION INTRAVENOUS; SUBCUTANEOUS ONCE
Status: COMPLETED | OUTPATIENT
Start: 2022-01-01 | End: 2022-01-01

## 2022-01-01 RX ORDER — GUAIFENESIN 100 MG/5ML
81 LIQUID (ML) ORAL DAILY
Status: DISCONTINUED | OUTPATIENT
Start: 2022-01-01 | End: 2022-01-01

## 2022-01-01 RX ORDER — LORAZEPAM 2 MG/ML
1 INJECTION INTRAMUSCULAR
Status: DISCONTINUED | OUTPATIENT
Start: 2022-01-01 | End: 2022-01-01

## 2022-01-01 RX ORDER — DEXAMETHASONE 4 MG/1
10 TABLET ORAL DAILY
Status: COMPLETED | OUTPATIENT
Start: 2022-01-01 | End: 2022-01-01

## 2022-01-01 RX ORDER — ONDANSETRON 4 MG/1
4 TABLET, ORALLY DISINTEGRATING ORAL
Status: DISCONTINUED | OUTPATIENT
Start: 2022-01-01 | End: 2022-01-01

## 2022-01-01 RX ORDER — METOPROLOL SUCCINATE 50 MG/1
50 TABLET, EXTENDED RELEASE ORAL DAILY
Status: DISCONTINUED | OUTPATIENT
Start: 2022-01-01 | End: 2022-01-01

## 2022-01-01 RX ORDER — MAGNESIUM SULFATE 100 %
4 CRYSTALS MISCELLANEOUS AS NEEDED
Status: DISCONTINUED | OUTPATIENT
Start: 2022-01-01 | End: 2022-01-01

## 2022-01-01 RX ORDER — ACETAMINOPHEN 325 MG/1
650 TABLET ORAL
Status: DISCONTINUED | OUTPATIENT
Start: 2022-01-01 | End: 2022-01-01

## 2022-01-01 RX ORDER — SODIUM CHLORIDE 0.9 % (FLUSH) 0.9 %
5-40 SYRINGE (ML) INJECTION EVERY 8 HOURS
Status: DISCONTINUED | OUTPATIENT
Start: 2022-01-01 | End: 2022-03-17 | Stop reason: HOSPADM

## 2022-01-01 RX ORDER — ACETAMINOPHEN 650 MG/1
650 SUPPOSITORY RECTAL
Status: DISCONTINUED | OUTPATIENT
Start: 2022-01-01 | End: 2022-01-01

## 2022-01-01 RX ORDER — ALBUTEROL SULFATE 90 UG/1
2 AEROSOL, METERED RESPIRATORY (INHALATION)
Status: DISCONTINUED | OUTPATIENT
Start: 2022-01-01 | End: 2022-01-01

## 2022-01-01 RX ORDER — INSULIN LISPRO 100 [IU]/ML
9 INJECTION, SOLUTION INTRAVENOUS; SUBCUTANEOUS ONCE
Status: COMPLETED | OUTPATIENT
Start: 2022-01-01 | End: 2022-01-01

## 2022-01-01 RX ORDER — BALSAM PERU/CASTOR OIL
OINTMENT (GRAM) TOPICAL 2 TIMES DAILY
Status: DISCONTINUED | OUTPATIENT
Start: 2022-01-01 | End: 2022-03-17 | Stop reason: HOSPADM

## 2022-01-01 RX ORDER — SODIUM CHLORIDE 0.9 % (FLUSH) 0.9 %
5-40 SYRINGE (ML) INJECTION AS NEEDED
Status: DISCONTINUED | OUTPATIENT
Start: 2022-01-01 | End: 2022-03-17 | Stop reason: HOSPADM

## 2022-01-01 RX ORDER — LORAZEPAM 2 MG/ML
1 INJECTION INTRAMUSCULAR
Status: DISCONTINUED | OUTPATIENT
Start: 2022-01-01 | End: 2022-03-17 | Stop reason: HOSPADM

## 2022-01-01 RX ORDER — FUROSEMIDE 10 MG/ML
40 INJECTION INTRAMUSCULAR; INTRAVENOUS
Status: COMPLETED | OUTPATIENT
Start: 2022-01-01 | End: 2022-01-01

## 2022-01-01 RX ADMIN — Medication 2 UNITS: at 08:02

## 2022-01-01 RX ADMIN — ASPIRIN 81 MG: 81 TABLET, CHEWABLE ORAL at 10:33

## 2022-01-01 RX ADMIN — Medication 3 UNITS: at 22:28

## 2022-01-01 RX ADMIN — SODIUM CHLORIDE, PRESERVATIVE FREE 10 ML: 5 INJECTION INTRAVENOUS at 07:07

## 2022-01-01 RX ADMIN — DEXAMETHASONE 10 MG: 4 TABLET ORAL at 09:24

## 2022-01-01 RX ADMIN — SODIUM CHLORIDE, PRESERVATIVE FREE 10 ML: 5 INJECTION INTRAVENOUS at 08:27

## 2022-01-01 RX ADMIN — ENOXAPARIN SODIUM 40 MG: 100 INJECTION SUBCUTANEOUS at 10:33

## 2022-01-01 RX ADMIN — SODIUM CHLORIDE, PRESERVATIVE FREE 10 ML: 5 INJECTION INTRAVENOUS at 05:58

## 2022-01-01 RX ADMIN — BARICITINIB 4 MG: 2 TABLET, FILM COATED ORAL at 08:17

## 2022-01-01 RX ADMIN — METHYLPREDNISOLONE SODIUM SUCCINATE 40 MG: 40 INJECTION, POWDER, FOR SOLUTION INTRAMUSCULAR; INTRAVENOUS at 13:26

## 2022-01-01 RX ADMIN — Medication 3 UNITS: at 16:55

## 2022-01-01 RX ADMIN — SODIUM CHLORIDE, PRESERVATIVE FREE 10 ML: 5 INJECTION INTRAVENOUS at 15:22

## 2022-01-01 RX ADMIN — Medication 20 UNITS: at 07:30

## 2022-01-01 RX ADMIN — METOPROLOL SUCCINATE 50 MG: 50 TABLET, FILM COATED, EXTENDED RELEASE ORAL at 09:03

## 2022-01-01 RX ADMIN — DEXAMETHASONE SODIUM PHOSPHATE 20 MG: 4 INJECTION, SOLUTION INTRAMUSCULAR; INTRAVENOUS at 13:57

## 2022-01-01 RX ADMIN — Medication 3 UNITS: at 12:16

## 2022-01-01 RX ADMIN — Medication 10 UNITS: at 16:03

## 2022-01-01 RX ADMIN — Medication 3 UNITS: at 08:17

## 2022-01-01 RX ADMIN — ATORVASTATIN CALCIUM 10 MG: 10 TABLET, FILM COATED ORAL at 09:47

## 2022-01-01 RX ADMIN — SODIUM CHLORIDE, PRESERVATIVE FREE 10 ML: 5 INJECTION INTRAVENOUS at 14:04

## 2022-01-01 RX ADMIN — ENOXAPARIN SODIUM 40 MG: 100 INJECTION SUBCUTANEOUS at 09:55

## 2022-01-01 RX ADMIN — SODIUM CHLORIDE, PRESERVATIVE FREE 10 ML: 5 INJECTION INTRAVENOUS at 15:03

## 2022-01-01 RX ADMIN — Medication 25 UNITS: at 17:11

## 2022-01-01 RX ADMIN — Medication 5 UNITS: at 22:10

## 2022-01-01 RX ADMIN — CASTOR OIL AND BALSAM, PERU: 788; 87 OINTMENT TOPICAL at 10:41

## 2022-01-01 RX ADMIN — ASPIRIN 81 MG: 81 TABLET, CHEWABLE ORAL at 09:25

## 2022-01-01 RX ADMIN — LORAZEPAM 1 MG: 2 INJECTION INTRAMUSCULAR; INTRAVENOUS at 11:00

## 2022-01-01 RX ADMIN — ACETAMINOPHEN 325MG 650 MG: 325 TABLET ORAL at 22:40

## 2022-01-01 RX ADMIN — ASPIRIN 81 MG: 81 TABLET, CHEWABLE ORAL at 09:55

## 2022-01-01 RX ADMIN — SODIUM CHLORIDE, PRESERVATIVE FREE 10 ML: 5 INJECTION INTRAVENOUS at 21:59

## 2022-01-01 RX ADMIN — POLYETHYLENE GLYCOL 3350 17 G: 17 POWDER, FOR SOLUTION ORAL at 12:20

## 2022-01-01 RX ADMIN — SODIUM CHLORIDE, PRESERVATIVE FREE 10 ML: 5 INJECTION INTRAVENOUS at 21:31

## 2022-01-01 RX ADMIN — Medication 2 UNITS: at 12:54

## 2022-01-01 RX ADMIN — ENOXAPARIN SODIUM 40 MG: 100 INJECTION SUBCUTANEOUS at 08:53

## 2022-01-01 RX ADMIN — ENOXAPARIN SODIUM 40 MG: 100 INJECTION SUBCUTANEOUS at 08:27

## 2022-01-01 RX ADMIN — SODIUM CHLORIDE, PRESERVATIVE FREE 10 ML: 5 INJECTION INTRAVENOUS at 21:29

## 2022-01-01 RX ADMIN — SODIUM CHLORIDE, PRESERVATIVE FREE 10 ML: 5 INJECTION INTRAVENOUS at 21:58

## 2022-01-01 RX ADMIN — MORPHINE SULFATE 1 MG: 2 INJECTION, SOLUTION INTRAMUSCULAR; INTRAVENOUS at 17:50

## 2022-01-01 RX ADMIN — Medication 25 UNITS: at 17:03

## 2022-01-01 RX ADMIN — Medication 3 UNITS: at 22:12

## 2022-01-01 RX ADMIN — LISINOPRIL 10 MG: 10 TABLET ORAL at 09:03

## 2022-01-01 RX ADMIN — ASPIRIN 81 MG: 81 TABLET, CHEWABLE ORAL at 08:53

## 2022-01-01 RX ADMIN — MORPHINE SULFATE 1 MG: 2 INJECTION, SOLUTION INTRAMUSCULAR; INTRAVENOUS at 17:58

## 2022-01-01 RX ADMIN — Medication 25 UNITS: at 16:34

## 2022-01-01 RX ADMIN — Medication 25 UNITS: at 09:55

## 2022-01-01 RX ADMIN — Medication 15 UNITS: at 17:13

## 2022-01-01 RX ADMIN — LISINOPRIL 10 MG: 10 TABLET ORAL at 10:33

## 2022-01-01 RX ADMIN — ASPIRIN 81 MG: 81 TABLET, CHEWABLE ORAL at 09:47

## 2022-01-01 RX ADMIN — ASPIRIN 81 MG: 81 TABLET, CHEWABLE ORAL at 09:23

## 2022-01-01 RX ADMIN — LISINOPRIL 10 MG: 10 TABLET ORAL at 10:11

## 2022-01-01 RX ADMIN — SODIUM CHLORIDE, PRESERVATIVE FREE 10 ML: 5 INJECTION INTRAVENOUS at 13:49

## 2022-01-01 RX ADMIN — SODIUM CHLORIDE, PRESERVATIVE FREE 10 ML: 5 INJECTION INTRAVENOUS at 22:29

## 2022-01-01 RX ADMIN — Medication 4 UNITS: at 21:58

## 2022-01-01 RX ADMIN — SODIUM CHLORIDE, PRESERVATIVE FREE 10 ML: 5 INJECTION INTRAVENOUS at 13:57

## 2022-01-01 RX ADMIN — ASPIRIN 81 MG: 81 TABLET, CHEWABLE ORAL at 08:17

## 2022-01-01 RX ADMIN — SODIUM CHLORIDE, PRESERVATIVE FREE 10 ML: 5 INJECTION INTRAVENOUS at 22:22

## 2022-01-01 RX ADMIN — SODIUM CHLORIDE, PRESERVATIVE FREE 10 ML: 5 INJECTION INTRAVENOUS at 05:31

## 2022-01-01 RX ADMIN — METOPROLOL SUCCINATE 50 MG: 50 TABLET, FILM COATED, EXTENDED RELEASE ORAL at 08:27

## 2022-01-01 RX ADMIN — IOPAMIDOL 80 ML: 755 INJECTION, SOLUTION INTRAVENOUS at 15:14

## 2022-01-01 RX ADMIN — ATORVASTATIN CALCIUM 10 MG: 10 TABLET, FILM COATED ORAL at 10:40

## 2022-01-01 RX ADMIN — SODIUM CHLORIDE, PRESERVATIVE FREE 10 ML: 5 INJECTION INTRAVENOUS at 17:03

## 2022-01-01 RX ADMIN — DEXAMETHASONE 6 MG: 4 TABLET ORAL at 09:03

## 2022-01-01 RX ADMIN — METHYLPREDNISOLONE SODIUM SUCCINATE 40 MG: 40 INJECTION, POWDER, FOR SOLUTION INTRAMUSCULAR; INTRAVENOUS at 09:44

## 2022-01-01 RX ADMIN — LISINOPRIL 10 MG: 10 TABLET ORAL at 08:27

## 2022-01-01 RX ADMIN — IPRATROPIUM BROMIDE AND ALBUTEROL 1 PUFF: 20; 100 SPRAY, METERED RESPIRATORY (INHALATION) at 13:55

## 2022-01-01 RX ADMIN — SODIUM CHLORIDE, PRESERVATIVE FREE 10 ML: 5 INJECTION INTRAVENOUS at 22:12

## 2022-01-01 RX ADMIN — Medication 5 UNITS: at 12:01

## 2022-01-01 RX ADMIN — SODIUM CHLORIDE, PRESERVATIVE FREE 10 ML: 5 INJECTION INTRAVENOUS at 23:00

## 2022-01-01 RX ADMIN — BARICITINIB 4 MG: 2 TABLET, FILM COATED ORAL at 09:55

## 2022-01-01 RX ADMIN — ATORVASTATIN CALCIUM 10 MG: 10 TABLET, FILM COATED ORAL at 08:26

## 2022-01-01 RX ADMIN — ENOXAPARIN SODIUM 40 MG: 100 INJECTION SUBCUTANEOUS at 09:47

## 2022-01-01 RX ADMIN — CASTOR OIL AND BALSAM, PERU: 788; 87 OINTMENT TOPICAL at 09:49

## 2022-01-01 RX ADMIN — BARICITINIB 4 MG: 2 TABLET, FILM COATED ORAL at 10:10

## 2022-01-01 RX ADMIN — Medication 5 UNITS: at 22:06

## 2022-01-01 RX ADMIN — ENOXAPARIN SODIUM 40 MG: 100 INJECTION SUBCUTANEOUS at 10:10

## 2022-01-01 RX ADMIN — Medication 20 UNITS: at 17:48

## 2022-01-01 RX ADMIN — Medication 2 UNITS: at 12:19

## 2022-01-01 RX ADMIN — ENOXAPARIN SODIUM 40 MG: 100 INJECTION SUBCUTANEOUS at 08:17

## 2022-01-01 RX ADMIN — Medication 3 UNITS: at 21:44

## 2022-01-01 RX ADMIN — SODIUM CHLORIDE, PRESERVATIVE FREE 10 ML: 5 INJECTION INTRAVENOUS at 11:52

## 2022-01-01 RX ADMIN — SODIUM CHLORIDE, PRESERVATIVE FREE 10 ML: 5 INJECTION INTRAVENOUS at 21:45

## 2022-01-01 RX ADMIN — SODIUM CHLORIDE, PRESERVATIVE FREE 10 ML: 5 INJECTION INTRAVENOUS at 15:29

## 2022-01-01 RX ADMIN — Medication 5 UNITS: at 21:29

## 2022-01-01 RX ADMIN — Medication 20 UNITS: at 17:13

## 2022-01-01 RX ADMIN — ATORVASTATIN CALCIUM 10 MG: 10 TABLET, FILM COATED ORAL at 09:07

## 2022-01-01 RX ADMIN — Medication 4 UNITS: at 23:00

## 2022-01-01 RX ADMIN — SODIUM CHLORIDE, PRESERVATIVE FREE 10 ML: 5 INJECTION INTRAVENOUS at 21:55

## 2022-01-01 RX ADMIN — ENOXAPARIN SODIUM 40 MG: 100 INJECTION SUBCUTANEOUS at 09:26

## 2022-01-01 RX ADMIN — SODIUM CHLORIDE, PRESERVATIVE FREE 10 ML: 5 INJECTION INTRAVENOUS at 05:46

## 2022-01-01 RX ADMIN — ATORVASTATIN CALCIUM 10 MG: 10 TABLET, FILM COATED ORAL at 09:33

## 2022-01-01 RX ADMIN — CASTOR OIL AND BALSAM, PERU: 788; 87 OINTMENT TOPICAL at 15:29

## 2022-01-01 RX ADMIN — Medication 3 UNITS: at 11:53

## 2022-01-01 RX ADMIN — Medication 3 UNITS: at 16:15

## 2022-01-01 RX ADMIN — DEXAMETHASONE 10 MG: 4 TABLET ORAL at 10:40

## 2022-01-01 RX ADMIN — Medication 20 UNITS: at 09:02

## 2022-01-01 RX ADMIN — Medication 5 UNITS: at 13:36

## 2022-01-01 RX ADMIN — SODIUM CHLORIDE, PRESERVATIVE FREE 10 ML: 5 INJECTION INTRAVENOUS at 18:16

## 2022-01-01 RX ADMIN — SODIUM CHLORIDE, PRESERVATIVE FREE 10 ML: 5 INJECTION INTRAVENOUS at 14:00

## 2022-01-01 RX ADMIN — Medication 5 UNITS: at 22:23

## 2022-01-01 RX ADMIN — SODIUM CHLORIDE, PRESERVATIVE FREE 10 ML: 5 INJECTION INTRAVENOUS at 06:00

## 2022-01-01 RX ADMIN — BARICITINIB 4 MG: 2 TABLET, FILM COATED ORAL at 09:07

## 2022-01-01 RX ADMIN — ATORVASTATIN CALCIUM 10 MG: 10 TABLET, FILM COATED ORAL at 09:23

## 2022-01-01 RX ADMIN — ENOXAPARIN SODIUM 40 MG: 100 INJECTION SUBCUTANEOUS at 09:32

## 2022-01-01 RX ADMIN — SODIUM CHLORIDE, PRESERVATIVE FREE 10 ML: 5 INJECTION INTRAVENOUS at 13:30

## 2022-01-01 RX ADMIN — MORPHINE SULFATE 1 MG: 2 INJECTION, SOLUTION INTRAMUSCULAR; INTRAVENOUS at 10:25

## 2022-01-01 RX ADMIN — DEXAMETHASONE 6 MG: 4 TABLET ORAL at 13:36

## 2022-01-01 RX ADMIN — IPRATROPIUM BROMIDE AND ALBUTEROL 1 PUFF: 20; 100 SPRAY, METERED RESPIRATORY (INHALATION) at 15:22

## 2022-01-01 RX ADMIN — Medication 2 UNITS: at 11:50

## 2022-01-01 RX ADMIN — ATORVASTATIN CALCIUM 10 MG: 10 TABLET, FILM COATED ORAL at 09:55

## 2022-01-01 RX ADMIN — DEXAMETHASONE SODIUM PHOSPHATE 20 MG: 4 INJECTION, SOLUTION INTRAMUSCULAR; INTRAVENOUS at 12:55

## 2022-01-01 RX ADMIN — Medication 20 UNITS: at 09:33

## 2022-01-01 RX ADMIN — CASTOR OIL AND BALSAM, PERU: 788; 87 OINTMENT TOPICAL at 21:27

## 2022-01-01 RX ADMIN — Medication 20 UNITS: at 09:47

## 2022-01-01 RX ADMIN — CASTOR OIL AND BALSAM, PERU: 788; 87 OINTMENT TOPICAL at 09:26

## 2022-01-01 RX ADMIN — CASTOR OIL AND BALSAM, PERU: 788; 87 OINTMENT TOPICAL at 08:17

## 2022-01-01 RX ADMIN — CASTOR OIL AND BALSAM, PERU: 788; 87 OINTMENT TOPICAL at 20:56

## 2022-01-01 RX ADMIN — Medication 2 UNITS: at 12:02

## 2022-01-01 RX ADMIN — DEXAMETHASONE 10 MG: 4 TABLET ORAL at 09:47

## 2022-01-01 RX ADMIN — ASPIRIN 81 MG: 81 TABLET, CHEWABLE ORAL at 09:03

## 2022-01-01 RX ADMIN — CASTOR OIL AND BALSAM, PERU: 788; 87 OINTMENT TOPICAL at 20:39

## 2022-01-01 RX ADMIN — Medication 5 UNITS: at 21:56

## 2022-01-01 RX ADMIN — DEXAMETHASONE SODIUM PHOSPHATE 20 MG: 4 INJECTION, SOLUTION INTRAMUSCULAR; INTRAVENOUS at 13:07

## 2022-01-01 RX ADMIN — CASTOR OIL AND BALSAM, PERU: 788; 87 OINTMENT TOPICAL at 20:17

## 2022-01-01 RX ADMIN — ATORVASTATIN CALCIUM 10 MG: 10 TABLET, FILM COATED ORAL at 08:02

## 2022-01-01 RX ADMIN — SODIUM CHLORIDE, PRESERVATIVE FREE 10 ML: 5 INJECTION INTRAVENOUS at 06:14

## 2022-01-01 RX ADMIN — FUROSEMIDE 40 MG: 10 INJECTION, SOLUTION INTRAMUSCULAR; INTRAVENOUS at 15:37

## 2022-01-01 RX ADMIN — CASTOR OIL AND BALSAM, PERU: 788; 87 OINTMENT TOPICAL at 08:10

## 2022-01-01 RX ADMIN — Medication 3 UNITS: at 11:31

## 2022-01-01 RX ADMIN — LORAZEPAM 1 MG: 2 INJECTION INTRAMUSCULAR; INTRAVENOUS at 18:07

## 2022-01-01 RX ADMIN — ASPIRIN 81 MG: 81 TABLET, CHEWABLE ORAL at 09:33

## 2022-01-01 RX ADMIN — DEXTROSE MONOHYDRATE 250 ML: 100 INJECTION, SOLUTION INTRAVENOUS at 07:59

## 2022-01-01 RX ADMIN — ACETAMINOPHEN 325MG 650 MG: 325 TABLET ORAL at 10:22

## 2022-01-01 RX ADMIN — Medication 3 UNITS: at 10:33

## 2022-01-01 RX ADMIN — BARICITINIB 4 MG: 2 TABLET, FILM COATED ORAL at 09:47

## 2022-01-01 RX ADMIN — ASPIRIN 81 MG: 81 TABLET, CHEWABLE ORAL at 09:45

## 2022-01-01 RX ADMIN — CASTOR OIL AND BALSAM, PERU: 788; 87 OINTMENT TOPICAL at 21:58

## 2022-01-01 RX ADMIN — LORAZEPAM 1 MG: 2 INJECTION INTRAMUSCULAR; INTRAVENOUS at 00:38

## 2022-01-01 RX ADMIN — BARICITINIB 4 MG: 2 TABLET, FILM COATED ORAL at 09:33

## 2022-01-01 RX ADMIN — CASTOR OIL AND BALSAM, PERU: 788; 87 OINTMENT TOPICAL at 12:00

## 2022-01-01 RX ADMIN — ENOXAPARIN SODIUM 40 MG: 100 INJECTION SUBCUTANEOUS at 08:02

## 2022-01-01 RX ADMIN — ATORVASTATIN CALCIUM 10 MG: 10 TABLET, FILM COATED ORAL at 10:11

## 2022-01-01 RX ADMIN — CASTOR OIL AND BALSAM, PERU: 788; 87 OINTMENT TOPICAL at 21:31

## 2022-01-01 RX ADMIN — MORPHINE SULFATE 1 MG: 2 INJECTION, SOLUTION INTRAMUSCULAR; INTRAVENOUS at 09:44

## 2022-01-01 RX ADMIN — Medication 7 UNITS: at 15:03

## 2022-01-01 RX ADMIN — DEXAMETHASONE 10 MG: 4 TABLET ORAL at 09:32

## 2022-01-01 RX ADMIN — Medication 25 UNITS: at 08:27

## 2022-01-01 RX ADMIN — ENOXAPARIN SODIUM 40 MG: 100 INJECTION SUBCUTANEOUS at 09:07

## 2022-01-01 RX ADMIN — Medication 16 G: at 16:53

## 2022-01-01 RX ADMIN — SODIUM CHLORIDE, PRESERVATIVE FREE 10 ML: 5 INJECTION INTRAVENOUS at 17:10

## 2022-01-01 RX ADMIN — Medication 3 UNITS: at 17:47

## 2022-01-01 RX ADMIN — BARICITINIB 4 MG: 2 TABLET, FILM COATED ORAL at 08:53

## 2022-01-01 RX ADMIN — ASPIRIN 81 MG: 81 TABLET, CHEWABLE ORAL at 10:11

## 2022-01-01 RX ADMIN — Medication 4 UNITS: at 22:40

## 2022-01-01 RX ADMIN — BARICITINIB 4 MG: 2 TABLET, FILM COATED ORAL at 09:25

## 2022-01-01 RX ADMIN — DEXAMETHASONE SODIUM PHOSPHATE 20 MG: 4 INJECTION, SOLUTION INTRAMUSCULAR; INTRAVENOUS at 14:32

## 2022-01-01 RX ADMIN — ATORVASTATIN CALCIUM 10 MG: 10 TABLET, FILM COATED ORAL at 10:33

## 2022-01-01 RX ADMIN — ENOXAPARIN SODIUM 40 MG: 100 INJECTION SUBCUTANEOUS at 09:23

## 2022-01-01 RX ADMIN — Medication 2 UNITS: at 13:06

## 2022-01-01 RX ADMIN — SODIUM CHLORIDE, PRESERVATIVE FREE 10 ML: 5 INJECTION INTRAVENOUS at 22:40

## 2022-01-01 RX ADMIN — BARICITINIB 4 MG: 2 TABLET, FILM COATED ORAL at 09:03

## 2022-01-01 RX ADMIN — SODIUM CHLORIDE, PRESERVATIVE FREE 10 ML: 5 INJECTION INTRAVENOUS at 22:06

## 2022-01-01 RX ADMIN — MORPHINE SULFATE 1 MG: 2 INJECTION, SOLUTION INTRAMUSCULAR; INTRAVENOUS at 13:26

## 2022-01-01 RX ADMIN — Medication 20 UNITS: at 16:16

## 2022-01-01 RX ADMIN — BARICITINIB 4 MG: 2 TABLET, FILM COATED ORAL at 09:23

## 2022-01-01 RX ADMIN — LORAZEPAM 1 MG: 2 INJECTION INTRAMUSCULAR; INTRAVENOUS at 10:37

## 2022-01-01 RX ADMIN — IPRATROPIUM BROMIDE AND ALBUTEROL 1 PUFF: 20; 100 SPRAY, METERED RESPIRATORY (INHALATION) at 08:22

## 2022-01-01 RX ADMIN — ATORVASTATIN CALCIUM 10 MG: 10 TABLET, FILM COATED ORAL at 08:53

## 2022-01-01 RX ADMIN — Medication 7 UNITS: at 17:14

## 2022-01-01 RX ADMIN — ASPIRIN 81 MG: 81 TABLET, CHEWABLE ORAL at 08:02

## 2022-01-01 RX ADMIN — DEXAMETHASONE 10 MG: 4 TABLET ORAL at 09:07

## 2022-01-01 RX ADMIN — ENOXAPARIN SODIUM 40 MG: 100 INJECTION SUBCUTANEOUS at 08:09

## 2022-01-01 RX ADMIN — Medication 3 UNITS: at 11:39

## 2022-01-01 RX ADMIN — Medication 20 UNITS: at 10:12

## 2022-01-01 RX ADMIN — ATORVASTATIN CALCIUM 10 MG: 10 TABLET, FILM COATED ORAL at 09:03

## 2022-01-01 RX ADMIN — BARICITINIB 4 MG: 2 TABLET, FILM COATED ORAL at 08:26

## 2022-01-01 RX ADMIN — Medication 16 G: at 08:00

## 2022-01-01 RX ADMIN — BARICITINIB 4 MG: 2 TABLET, FILM COATED ORAL at 08:02

## 2022-01-01 RX ADMIN — ASPIRIN 81 MG: 81 TABLET, CHEWABLE ORAL at 10:40

## 2022-01-01 RX ADMIN — ATORVASTATIN CALCIUM 10 MG: 10 TABLET, FILM COATED ORAL at 08:17

## 2022-01-01 RX ADMIN — SODIUM CHLORIDE, PRESERVATIVE FREE 10 ML: 5 INJECTION INTRAVENOUS at 14:14

## 2022-01-01 RX ADMIN — SODIUM CHLORIDE, PRESERVATIVE FREE 10 ML: 5 INJECTION INTRAVENOUS at 16:18

## 2022-01-01 RX ADMIN — Medication 8 UNITS: at 16:30

## 2022-01-01 RX ADMIN — DEXAMETHASONE 6 MG: 4 TABLET ORAL at 10:10

## 2022-01-01 RX ADMIN — BARICITINIB 4 MG: 2 TABLET, FILM COATED ORAL at 10:40

## 2022-01-01 RX ADMIN — LORAZEPAM 1 MG: 2 INJECTION INTRAMUSCULAR; INTRAVENOUS at 17:50

## 2022-01-01 RX ADMIN — SODIUM CHLORIDE, PRESERVATIVE FREE 10 ML: 5 INJECTION INTRAVENOUS at 21:26

## 2022-01-01 RX ADMIN — Medication 25 UNITS: at 16:30

## 2022-01-01 RX ADMIN — METOPROLOL SUCCINATE 50 MG: 50 TABLET, FILM COATED, EXTENDED RELEASE ORAL at 10:32

## 2022-01-01 RX ADMIN — Medication 3 UNITS: at 17:04

## 2022-01-01 RX ADMIN — Medication 25 UNITS: at 08:02

## 2022-01-01 RX ADMIN — ENOXAPARIN SODIUM 40 MG: 100 INJECTION SUBCUTANEOUS at 09:02

## 2022-01-01 RX ADMIN — Medication 2 UNITS: at 10:11

## 2022-01-01 RX ADMIN — METOPROLOL SUCCINATE 50 MG: 50 TABLET, FILM COATED, EXTENDED RELEASE ORAL at 10:11

## 2022-01-01 RX ADMIN — Medication 2 UNITS: at 17:08

## 2022-01-01 RX ADMIN — IPRATROPIUM BROMIDE AND ALBUTEROL 1 PUFF: 20; 100 SPRAY, METERED RESPIRATORY (INHALATION) at 19:41

## 2022-01-01 RX ADMIN — Medication 3 UNITS: at 16:34

## 2022-01-01 RX ADMIN — LORAZEPAM 1 MG: 2 INJECTION INTRAMUSCULAR; INTRAVENOUS at 22:38

## 2022-01-01 RX ADMIN — Medication 2 UNITS: at 18:14

## 2022-01-01 RX ADMIN — BARICITINIB 4 MG: 2 TABLET, FILM COATED ORAL at 13:36

## 2022-01-01 RX ADMIN — SODIUM CHLORIDE, PRESERVATIVE FREE 10 ML: 5 INJECTION INTRAVENOUS at 06:53

## 2022-01-01 RX ADMIN — Medication 25 UNITS: at 09:24

## 2022-01-01 RX ADMIN — SODIUM CHLORIDE 1000 ML: 9 INJECTION, SOLUTION INTRAVENOUS at 15:00

## 2022-01-01 RX ADMIN — SODIUM CHLORIDE, PRESERVATIVE FREE 10 ML: 5 INJECTION INTRAVENOUS at 05:25

## 2022-01-01 RX ADMIN — ONDANSETRON 4 MG: 4 TABLET, ORALLY DISINTEGRATING ORAL at 11:52

## 2022-01-01 RX ADMIN — ASPIRIN 81 MG: 81 TABLET, CHEWABLE ORAL at 08:26

## 2022-01-01 RX ADMIN — POLYETHYLENE GLYCOL 3350 17 G: 17 POWDER, FOR SOLUTION ORAL at 10:53

## 2022-01-01 RX ADMIN — ATORVASTATIN CALCIUM 10 MG: 10 TABLET, FILM COATED ORAL at 09:24

## 2022-01-01 RX ADMIN — Medication 20 UNITS: at 17:07

## 2022-01-01 RX ADMIN — SODIUM CHLORIDE, PRESERVATIVE FREE 10 ML: 5 INJECTION INTRAVENOUS at 06:33

## 2022-01-01 RX ADMIN — Medication 9 UNITS: at 21:25

## 2022-01-01 RX ADMIN — INSULIN GLARGINE 7 UNITS: 100 INJECTION, SOLUTION SUBCUTANEOUS at 10:33

## 2022-01-01 RX ADMIN — DEXAMETHASONE SODIUM PHOSPHATE 20 MG: 4 INJECTION, SOLUTION INTRAMUSCULAR; INTRAVENOUS at 13:49

## 2022-01-01 RX ADMIN — IPRATROPIUM BROMIDE AND ALBUTEROL 1 PUFF: 20; 100 SPRAY, METERED RESPIRATORY (INHALATION) at 08:36

## 2022-01-01 RX ADMIN — ENOXAPARIN SODIUM 40 MG: 100 INJECTION SUBCUTANEOUS at 10:40

## 2022-01-01 RX ADMIN — IPRATROPIUM BROMIDE AND ALBUTEROL 1 PUFF: 20; 100 SPRAY, METERED RESPIRATORY (INHALATION) at 21:47

## 2022-01-01 RX ADMIN — DEXAMETHASONE 6 MG: 4 TABLET ORAL at 08:26

## 2022-02-23 NOTE — ED PROVIDER NOTES
EMERGENCY DEPARTMENT HISTORY AND PHYSICAL EXAM      Date: 2/23/2022  Patient Name: Tamar Rodriguez    History of Presenting Illness     Chief Complaint   Patient presents with    Cough     Cough with nasal and chest congestion for 10 days, reports poor appetite, weakness, and headache    High Blood Sugar     Friend reports not taking meds consistently, some confusion reported. HPI: Tamar Rodriguez, 66 y.o. female presents to the ED with cc of several days of cough and chest tightness, decreased p.o. intake, and intermittent confusion. She has not been feeling well per her report. She apparently has not been taking her medications consistently and her friend is concerned that her blood sugar has been out of control. There are no other complaints, changes, or physical findings at this time. PCP: None    No current facility-administered medications on file prior to encounter. Current Outpatient Medications on File Prior to Encounter   Medication Sig Dispense Refill    glipiZIDE SR (GLUCOTROL XL) 10 mg CR tablet Take 10 mg by mouth daily.  metoprolol succinate (TOPROL-XL) 50 mg XL tablet Take 50 mg by mouth daily.  dulaglutide (TRULICITY) 1.5 IT/9.8 mL sub-q pen 1.5 mg by SubCUTAneous route every seven (7) days.  metformin (GLUCOPHAGE) 500 mg tablet Take 1 tablet by mouth two (2) times daily (with meals). 180 tablet 6    glucose blood VI test strips (ASCENSIA CONTOUR) strip Check blood glucose 2 times a day 1 Package 11    lisinopril (PRINIVIL, ZESTRIL) 10 mg tablet Take 1 tablet by mouth daily. 90 tablet 3    Lancets (LANCETS,ULTRA THIN) misc Check glucose 2 times a day 1 Package 11    simvastatin (ZOCOR) 20 mg tablet Take 1 Tab by mouth nightly. 30 Tab 3    ofloxacin (FLOXIN) 0.3 % otic solution Administer 5 Drops into each ear daily. 20 mL 0    aspirin 81 mg chewable tablet Take 81 mg by mouth daily.          Past History     Past Medical History:  Past Medical History: Diagnosis Date    [facility]     Hypercholesterolemia     Hypertension        Past Surgical History:  Past Surgical History:   Procedure Laterality Date    HX GYN      HX HYSTERECTOMY N/A        Family History:  Family History   Problem Relation Age of Onset    Cancer Mother     Diabetes Mother     Heart Disease Mother     Diabetes Sister     Diabetes Sister     Diabetes Sister     Diabetes Sister     Cancer Sister        Social History:  Social History     Tobacco Use    Smoking status: Former Smoker     Packs/day: 0.50     Years: 4.00     Pack years: 2.00     Quit date: 1975     Years since quittin.7    Smokeless tobacco: Never Used   Substance Use Topics    Alcohol use: No    Drug use: No       Allergies:  No Known Allergies      Review of Systems   Review of Systems   Unable to perform ROS: Other       Physical Exam   Physical Exam  Vitals and nursing note reviewed. Constitutional:       Appearance: Normal appearance. HENT:      Head: Normocephalic and atraumatic. Mouth/Throat:      Mouth: Mucous membranes are moist.   Eyes:      Extraocular Movements: Extraocular movements intact. Cardiovascular:      Rate and Rhythm: Normal rate and regular rhythm. Pulses: Normal pulses. Pulmonary:      Effort: Pulmonary effort is normal.      Breath sounds: Normal breath sounds. Abdominal:      Palpations: Abdomen is soft. Tenderness: There is no abdominal tenderness. Musculoskeletal:         General: No deformity. Cervical back: Neck supple. Skin:     General: Skin is warm and dry. Neurological:      General: No focal deficit present. Mental Status: She is alert and oriented to person, place, and time. Cranial Nerves: No cranial nerve deficit. Sensory: No sensory deficit. Motor: No weakness.       Coordination: Coordination normal.      Gait: Gait normal.   Psychiatric:         Mood and Affect: Mood normal.         Behavior: Behavior normal.         Diagnostic Study Results     Labs -     Recent Results (from the past 24 hour(s))   CBC WITH AUTOMATED DIFF    Collection Time: 02/23/22  1:36 PM   Result Value Ref Range    WBC 4.0 3.6 - 11.0 K/uL    RBC 4.90 3.80 - 5.20 M/uL    HGB 14.5 11.5 - 16.0 g/dL    HCT 42.5 35.0 - 47.0 %    MCV 86.7 80.0 - 99.0 FL    MCH 29.6 26.0 - 34.0 PG    MCHC 34.1 30.0 - 36.5 g/dL    RDW 11.9 11.5 - 14.5 %    PLATELET 827 284 - 972 K/uL    MPV 9.9 8.9 - 12.9 FL    NRBC 0.0 0  WBC    ABSOLUTE NRBC 0.00 0.00 - 0.01 K/uL    NEUTROPHILS 71 32 - 75 %    LYMPHOCYTES 20 12 - 49 %    MONOCYTES 9 5 - 13 %    EOSINOPHILS 0 0 - 7 %    BASOPHILS 0 0 - 1 %    IMMATURE GRANULOCYTES 0 0.0 - 0.5 %    ABS. NEUTROPHILS 2.8 1.8 - 8.0 K/UL    ABS. LYMPHOCYTES 0.8 0.8 - 3.5 K/UL    ABS. MONOCYTES 0.4 0.0 - 1.0 K/UL    ABS. EOSINOPHILS 0.0 0.0 - 0.4 K/UL    ABS. BASOPHILS 0.0 0.0 - 0.1 K/UL    ABS. IMM. GRANS. 0.0 0.00 - 0.04 K/UL    DF SMEAR SCANNED      RBC COMMENTS NORMOCYTIC, NORMOCHROMIC      WBC COMMENTS FEW     METABOLIC PANEL, COMPREHENSIVE    Collection Time: 02/23/22  1:36 PM   Result Value Ref Range    Sodium 134 (L) 136 - 145 mmol/L    Potassium 4.0 3.5 - 5.1 mmol/L    Chloride 98 97 - 108 mmol/L    CO2 26 21 - 32 mmol/L    Anion gap 10 5 - 15 mmol/L    Glucose 317 (H) 65 - 100 mg/dL    BUN 28 (H) 6 - 20 MG/DL    Creatinine 0.91 0.55 - 1.02 MG/DL    BUN/Creatinine ratio 31 (H) 12 - 20      GFR est AA >60 >60 ml/min/1.73m2    GFR est non-AA >60 >60 ml/min/1.73m2    Calcium 9.2 8.5 - 10.1 MG/DL    Bilirubin, total 0.6 0.2 - 1.0 MG/DL    ALT (SGPT) 25 12 - 78 U/L    AST (SGOT) 26 15 - 37 U/L    Alk.  phosphatase 85 45 - 117 U/L    Protein, total 8.0 6.4 - 8.2 g/dL    Albumin 3.5 3.5 - 5.0 g/dL    Globulin 4.5 (H) 2.0 - 4.0 g/dL    A-G Ratio 0.8 (L) 1.1 - 2.2     TROPONIN-HIGH SENSITIVITY    Collection Time: 02/23/22  1:36 PM   Result Value Ref Range    Troponin-High Sensitivity 9 0 - 51 ng/L   EKG, 12 LEAD, INITIAL Collection Time: 02/23/22  1:53 PM   Result Value Ref Range    Ventricular Rate 81 BPM    Atrial Rate 81 BPM    P-R Interval 182 ms    QRS Duration 84 ms    Q-T Interval 372 ms    QTC Calculation (Bezet) 432 ms    Calculated P Axis 46 degrees    Calculated R Axis -31 degrees    Calculated T Axis 72 degrees    Diagnosis       Normal sinus rhythm  Left axis deviation  Septal infarct , age undetermined    Confirmed by Nabil Carter ([id number]) on 2/23/2022 7:46:14 PM     URINALYSIS W/ RFLX MICROSCOPIC    Collection Time: 02/23/22  5:20 PM   Result Value Ref Range    Color DARK YELLOW      Appearance CLEAR CLEAR      Specific gravity >1.030 (H) 1.003 - 1.030    pH (UA) 5.5 5.0 - 8.0      Protein 100 (A) NEG mg/dL    Glucose >1,000 (A) NEG mg/dL    Ketone 80 (A) NEG mg/dL    Blood Negative NEG      Urobilinogen 1.0 0.2 - 1.0 EU/dL    Nitrites Negative NEG      Leukocyte Esterase SMALL (A) NEG      WBC 0-4 0 - 4 /hpf    RBC 0-5 0 - 5 /hpf    Epithelial cells MODERATE (A) FEW /lpf    Bacteria 1+ (A) NEG /hpf   BILIRUBIN, CONFIRM    Collection Time: 02/23/22  5:20 PM   Result Value Ref Range    Bilirubin UA, confirm Negative NEG         Radiologic Studies -   CT HEAD WO CONT   Final Result      1. No acute intracranial abnormality. 2. Mild age-related change. XR CHEST PORT   Final Result   No acute findings. CT Results  (Last 48 hours)               02/23/22 1619  CT HEAD WO CONT Final result    Impression:      1. No acute intracranial abnormality. 2. Mild age-related change. Narrative:  EXAM: CT HEAD WO CONT       INDICATION: confusion, intermittent HA       COMPARISON: None. CONTRAST: None. TECHNIQUE: Unenhanced CT of the head was performed using 5 mm images. Brain and   bone windows were generated. Coronal and sagittal reformats.  CT dose reduction   was achieved through use of a standardized protocol tailored for this   examination and automatic exposure control for dose modulation. FINDINGS:   The ventricles and sulci are normal in size, shape and configuration. .   Periventricular white matter low-density is very mild and diffuse. . There is no   intracranial hemorrhage, extra-axial collection, or mass effect. The basilar   cisterns are open. No CT evidence of acute infarct. The bone windows demonstrate no abnormalities. The visualized portions of the   paranasal sinuses and mastoid air cells are clear. CXR Results  (Last 48 hours)               02/23/22 1510  XR CHEST PORT Final result    Impression:  No acute findings. Narrative:  EXAM: XR CHEST PORT       INDICATION: Shortness of breath       COMPARISON: 8/15/2009       FINDINGS: A portable AP radiograph of the chest was obtained at 1502 hours. There is no pneumothorax or pleural effusion. The lungs are clear. Cardiac size   is within normal levels. There is no mediastinal or hilar enlargement is   suggested. The bones and soft tissues are grossly within normal limits. Medical Decision Making   I am the first provider for this patient. I reviewed the vital signs, available nursing notes, past medical history, past surgical history, family history and social history. Vital Signs-Reviewed the patient's vital signs. Patient Vitals for the past 24 hrs:   Temp Pulse Resp BP SpO2   02/23/22 1815  87 24 (!) 154/102 93 %   02/23/22 1710  92 19 (!) 154/87 92 %   02/23/22 1640  81 22 119/68 91 %   02/23/22 1510  87 17 (!) 151/75 94 %   02/23/22 1425  82 20 (!) 149/75 95 %   02/23/22 1325 99.2 °F (37.3 °C) 86 18 126/75 97 %         Provider Notes (Medical Decision Making):   Very pleasant, well-appearing 77-year-old lady presenting to ED with primary chief complaint of cough, chest congestion, and decreased p.o. intake. On my evaluation, she is awake, alert, conversant, appropriate, and oriented x3 with no neurological deficits. Her work-up in the ED today is reassuring. Her head CT shows no acute abnormalities, does not sound like stroke, mass, hemorrhage. Her urinalysis is negative for infection. Her electrolytes are all within normal limits. Ultimately, it sounds like patient may have a virus. More consequently, she has not been taking any of her medications because she has not had a primary care physician to see. We will plan for referral and we discussed the need for very close outpatient follow-up. Return precautions given. ED Course:     Initial assessment performed. The patients presenting problems have been discussed, and they are in agreement with the care plan formulated and outlined with them. I have encouraged them to ask questions as they arise throughout their visit. Disposition:  dc  PLAN:  1. Discharge Medication List as of 2/23/2022  5:59 PM      START taking these medications    Details   benzonatate (Tessalon Perles) 100 mg capsule Take 1 Capsule by mouth three (3) times daily as needed for Cough for up to 7 days. , Print, Disp-30 Capsule, R-0         CONTINUE these medications which have NOT CHANGED    Details   glipiZIDE SR (GLUCOTROL XL) 10 mg CR tablet Take 10 mg by mouth daily. , Historical Med      metoprolol succinate (TOPROL-XL) 50 mg XL tablet Take 50 mg by mouth daily. , Historical Med      dulaglutide (TRULICITY) 1.5 AL/1.3 mL sub-q pen 1.5 mg by SubCUTAneous route every seven (7) days. , Historical Med      metformin (GLUCOPHAGE) 500 mg tablet Take 1 tablet by mouth two (2) times daily (with meals). , Normal, Disp-180 tablet, R-6      glucose blood VI test strips (ASCENSIA CONTOUR) strip Check blood glucose 2 times a day, Normal, Disp-1 Package, R-11      lisinopril (PRINIVIL, ZESTRIL) 10 mg tablet Take 1 tablet by mouth daily. , Normal, Disp-90 tablet, R-3      Lancets (LANCETS,ULTRA THIN) misc Check glucose 2 times a day, Normal, Disp-1 Package, R-11      simvastatin (ZOCOR) 20 mg tablet Take 1 Tab by mouth nightly., Normal, Disp-30 Tab, R-3      ofloxacin (FLOXIN) 0.3 % otic solution Administer 5 Drops into each ear daily. , Normal, Disp-20 mL, R-0      aspirin 81 mg chewable tablet Take 81 mg by mouth daily. , Historical Med           2.    Follow-up Information     Follow up With Specialties Details Why [id number]  [phone]        Return to ED if worse     Diagnosis     Clinical Impression: cough, elevated BG

## 2022-02-23 NOTE — DISCHARGE INSTRUCTIONS
Follow up with your physician for further evaluation in 1-2 days. Return to ED with worsening of condition or new concerning symptoms.

## 2022-02-23 NOTE — ED NOTES
Patient discharged by Dr. Jovan Dodson. Patient provided with discharge instructions Rx and instructions on follow up care. Patient out of ED ambulatory accompanied by family.

## 2022-03-01 PROBLEM — J81.0 ACUTE PULMONARY EDEMA (HCC): Status: ACTIVE | Noted: 2022-01-01

## 2022-03-01 NOTE — ED NOTES
Patient is being transferred to POD 5, Room # POD 5. Report given to [id number] Dr on [facility] for routine progression of care. Report consisted of the following information SBAR, Kardex, ED Summary and MAR. Patient transferred to receiving unit by: tech. Outstanding consults needed: No     Next labs due: No     The following personal items will be sent with the patient during transfer to the floor: All valuables:    Cardiac monitoring ordered: Yes    The following CURRENT information was reported to the receiving RN:    Code status: Full Code at time of transfer    Last set of vital signs:  Vital Signs  Level of Consciousness: Alert (0) (03/01/22 1326)  Temp: 98.1 °F (36.7 °C) (03/01/22 1800)  Temp Source: Oral (03/01/22 1800)  Pulse (Heart Rate): 86 (03/01/22 1800)  Heart Rate Source: Monitor (03/01/22 1800)  Resp Rate: 21 (03/01/22 1800)  BP: 111/76 (03/01/22 1800)  MAP (Monitor): 78 (03/01/22 1633)  MAP (Calculated): 88 (03/01/22 1800)  BP 1 Location: Left arm (03/01/22 1326)  BP 1 Method: Automatic (03/01/22 1326)  BP Patient Position: At rest (03/01/22 1326)         Oxygen Therapy  O2 Sat (%): 91 % (03/01/22 1633)  Pulse via Oximetry: 83 beats per minute (03/01/22 1633)  O2 Device: Nasal cannula (03/01/22 1555)  O2 Flow Rate (L/min): 4 l/min (03/01/22 1555)      Last pain assessment:  Pain 1  Pain Scale 1: Numeric (0 - 10)  Pain Intensity 1: 0      Wounds: Yes, No     Urinary catheter: voiding  Is there a pate order: No     LDAs:       Peripheral IV 03/01/22 Anterior;Left;Proximal Antecubital (Active)         Opportunity for questions and clarification was provided.     Kevin Magallon RN

## 2022-03-01 NOTE — ED PROVIDER NOTES
EMERGENCY DEPARTMENT HISTORY AND PHYSICAL EXAM      Date: 3/1/2022  Patient Name: Kamila Hernandez  Patient Age and Sex: 66 y.o. female     History of Presenting Illness     Chief Complaint   Patient presents with    Fatigue     pt wheeled into triage with a cc of chest congestion, yellow tongue and weakness x 6 days; pt was seen here 6 days ago, diagnosed with virus and sent home; pts friend states that pt has not gotten better    Fall     pts friend states that pt fell yesterday and laid on the floor for 8 hours until son got home; pt states she did not hit her head       History Provided By: Patient, friend    HPI: Kamila Hernandez is a 49-year-old female presenting with shortness of breath and cough. Patient has a history of hypertension, diabetes and hyperlipidemia and states that for the past month has been having chest congestion as well as a cough. Patient states that she just wants to get this mucus out but cannot seem to get it out. Patient states that she came here 6 days ago and they did lab work and chest x-ray and was told it was most likely viral.  No Covid test was done she states at that time. Patient states that she has been taking Mucinex with no improvement. According to her friend, patient has been having more fatigue as well as increased falls. Was found on the floor by her son who lives with her but is out of the house during the day for work. Denies any chest pain or leg swelling. No history of congestive heart failure    There are no other complaints, changes, or physical findings at this time. PCP: None    No current facility-administered medications on file prior to encounter. Current Outpatient Medications on File Prior to Encounter   Medication Sig Dispense Refill    benzonatate (Tessalon Perles) 100 mg capsule Take 1 Capsule by mouth three (3) times daily as needed for Cough for up to 7 days.  30 Capsule 0    glipiZIDE SR (GLUCOTROL XL) 10 mg CR tablet Take 10 mg by mouth daily.      metoprolol succinate (TOPROL-XL) 50 mg XL tablet Take 50 mg by mouth daily.  dulaglutide (TRULICITY) 1.5 GZ/7.2 mL sub-q pen 1.5 mg by SubCUTAneous route every seven (7) days.  metformin (GLUCOPHAGE) 500 mg tablet Take 1 tablet by mouth two (2) times daily (with meals). 180 tablet 6    glucose blood VI test strips (ASCENSIA CONTOUR) strip Check blood glucose 2 times a day 1 Package 11    lisinopril (PRINIVIL, ZESTRIL) 10 mg tablet Take 1 tablet by mouth daily. 90 tablet 3    Lancets (LANCETS,ULTRA THIN) misc Check glucose 2 times a day 1 Package 11    simvastatin (ZOCOR) 20 mg tablet Take 1 Tab by mouth nightly. 30 Tab 3    ofloxacin (FLOXIN) 0.3 % otic solution Administer 5 Drops into each ear daily. 20 mL 0    aspirin 81 mg chewable tablet Take 81 mg by mouth daily. Past History     Past Medical History:  Past Medical History:   Diagnosis Date    Diabetes ([facility] [address]     Hypercholesterolemia     Hypertension        Past Surgical History:  Past Surgical History:   Procedure Laterality Date    HX GYN      HX HYSTERECTOMY N/A        Family History:  Family History   Problem Relation Age of Onset    Cancer Mother     Diabetes Mother     Heart Disease Mother     Diabetes Sister     Diabetes Sister     Diabetes Sister     Diabetes Sister     Cancer Sister        Social History:  Social History     Tobacco Use    Smoking status: Former Smoker     Packs/day: 0.50     Years: 4.00     Pack years: 2.00     Quit date: 1975     Years since quittin.7    Smokeless tobacco: Never Used   Substance Use Topics    Alcohol use: No    Drug use: No       Allergies:  No Known Allergies      Review of Systems   Review of Systems   Constitutional: Positive for fatigue. Negative for chills and fever. Respiratory: Positive for cough and shortness of breath. Cardiovascular: Negative for chest pain.    Gastrointestinal: Negative for abdominal pain, constipation, diarrhea, nausea and vomiting. Genitourinary: Negative for dysuria, frequency and hematuria. Neurological: Negative for weakness and numbness. All other systems reviewed and are negative. Physical Exam   Physical Exam  Vitals and nursing note reviewed. Constitutional:       Appearance: She is well-developed. HENT:      Head: Normocephalic and atraumatic. Nose: Nose normal.      Mouth/Throat:      Mouth: Mucous membranes are moist.   Eyes:      Extraocular Movements: Extraocular movements intact. Conjunctiva/sclera: Conjunctivae normal.   Cardiovascular:      Rate and Rhythm: Normal rate and regular rhythm. Pulmonary:      Effort: Pulmonary effort is normal. No respiratory distress. Breath sounds: Wheezing and rales present. Comments: Patient is saturating at 85% on room air so placed on 2 L with improvement. It does sound rhonchorous bilaterally with a wet cough  Abdominal:      General: There is no distension. Palpations: Abdomen is soft. Tenderness: There is no abdominal tenderness. Musculoskeletal:         General: Normal range of motion. Cervical back: Normal range of motion and neck supple. Skin:     General: Skin is warm and dry. Neurological:      General: No focal deficit present. Mental Status: She is alert and oriented to person, place, and time. Mental status is at baseline.    Psychiatric:         Mood and Affect: Mood normal.          Diagnostic Study Results     Labs -     Recent Results (from the past 12 hour(s))   EKG, 12 LEAD, INITIAL    Collection Time: 03/01/22  1:38 PM   Result Value Ref Range    Ventricular Rate 82 BPM    Atrial Rate 82 BPM    P-R Interval 172 ms    QRS Duration 74 ms    Q-T Interval 360 ms    QTC Calculation (Bezet) 420 ms    Calculated P Axis 22 degrees    Calculated R Axis -38 degrees    Calculated T Axis 37 degrees    Diagnosis       Sinus rhythm with premature atrial complexes with aberrant conduction  Left axis deviation  Minimal voltage criteria for LVH, may be normal variant  Anterolateral infarct (cited on or before 23-FEB-2022)  When compared with ECG of 23-FEB-2022 13:53,  aberrant conduction is now present  Questionable change in initial forces of Anteroseptal leads     CBC WITH AUTOMATED DIFF    Collection Time: 03/01/22  2:20 PM   Result Value Ref Range    WBC 6.8 3.6 - 11.0 K/uL    RBC 4.59 3.80 - 5.20 M/uL    HGB 13.3 11.5 - 16.0 g/dL    HCT 38.5 35.0 - 47.0 %    MCV 83.9 80.0 - 99.0 FL    MCH 29.0 26.0 - 34.0 PG    MCHC 34.5 30.0 - 36.5 g/dL    RDW 12.2 11.5 - 14.5 %    PLATELET 642 296 - 066 K/uL    MPV 9.1 8.9 - 12.9 FL    NRBC 0.0 0  WBC    ABSOLUTE NRBC 0.00 0.00 - 0.01 K/uL    NEUTROPHILS 86 (H) 32 - 75 %    LYMPHOCYTES 7 (L) 12 - 49 %    MONOCYTES 5 5 - 13 %    EOSINOPHILS 0 0 - 7 %    BASOPHILS 0 0 - 1 %    IMMATURE GRANULOCYTES 2 (H) 0.0 - 0.5 %    ABS. NEUTROPHILS 5.9 1.8 - 8.0 K/UL    ABS. LYMPHOCYTES 0.5 (L) 0.8 - 3.5 K/UL    ABS. MONOCYTES 0.3 0.0 - 1.0 K/UL    ABS. EOSINOPHILS 0.0 0.0 - 0.4 K/UL    ABS. BASOPHILS 0.0 0.0 - 0.1 K/UL    ABS. IMM. GRANS. 0.1 (H) 0.00 - 0.04 K/UL    DF SMEAR SCANNED      RBC COMMENTS NORMOCYTIC, NORMOCHROMIC     METABOLIC PANEL, COMPREHENSIVE    Collection Time: 03/01/22  2:20 PM   Result Value Ref Range    Sodium 138 136 - 145 mmol/L    Potassium 4.7 3.5 - 5.1 mmol/L    Chloride 104 97 - 108 mmol/L    CO2 24 21 - 32 mmol/L    Anion gap 10 5 - 15 mmol/L    Glucose 382 (H) 65 - 100 mg/dL    BUN 32 (H) 6 - 20 MG/DL    Creatinine 0.73 0.55 - 1.02 MG/DL    BUN/Creatinine ratio 44 (H) 12 - 20      GFR est AA >60 >60 ml/min/1.73m2    GFR est non-AA >60 >60 ml/min/1.73m2    Calcium 9.2 8.5 - 10.1 MG/DL    Bilirubin, total 0.7 0.2 - 1.0 MG/DL    ALT (SGPT) 18 12 - 78 U/L    AST (SGOT) 33 15 - 37 U/L    Alk.  phosphatase 120 (H) 45 - 117 U/L    Protein, total 7.6 6.4 - 8.2 g/dL    Albumin 2.6 (L) 3.5 - 5.0 g/dL    Globulin 5.0 (H) 2.0 - 4.0 g/dL    A-G Ratio 0.5 (L) 1.1 - 2.2 TROPONIN-HIGH SENSITIVITY    Collection Time: 03/01/22  2:20 PM   Result Value Ref Range    Troponin-High Sensitivity 14 0 - 51 ng/L   NT-PRO BNP    Collection Time: 03/01/22  2:20 PM   Result Value Ref Range    NT pro- (H) <125 PG/ML   GLUCOSE, POC    Collection Time: 03/01/22  3:44 PM   Result Value Ref Range    Glucose (POC) 379 (H) 65 - 117 mg/dL    Performed by Jacques Bergerau PCT        Radiologic Studies -   CTA CHEST W OR W WO CONT   Final Result   1. No evidence for pulmonary embolism. 2. Bilateral groundglass opacification may be due to edema, pneumonia, or   hemorrhage and clinical correlation is recommended. 3. Cholelithiasis. XR CHEST PORT   Final Result   Mild pulmonary edema. CT Results  (Last 48 hours)               03/01/22 1513  CTA CHEST W OR W WO CONT Final result    Impression:  1. No evidence for pulmonary embolism. 2. Bilateral groundglass opacification may be due to edema, pneumonia, or   hemorrhage and clinical correlation is recommended. 3. Cholelithiasis. Narrative:  History: Cough and hypoxia. CTA of the chest was performed. 80 mL Isovue-370 were injected and scanning was   performed during the arterial phase of contrast administration. Post processing   was performed. 3D reconstructions were performed. CT dose reduction was   achieved through use of a standardized protocol tailored for this examination   and automatic exposure control for dose modulation. There are no intraluminal filling defects to suggest pulmonary embolism. The   heart, pericardium, and great vessels appear unremarkable. The chest wall and   axilla appear unremarkable. There is diffuse and patchy bilateral groundglass   opacification with sparing of the lung apices. There is cholelithiasis. CXR Results  (Last 48 hours)               03/01/22 1404  XR CHEST PORT Final result    Impression:  Mild pulmonary edema.         Narrative: EXAM: XR CHEST PORT       INDICATION: sob       COMPARISON: 2/23/2022       FINDINGS: A portable AP radiograph of the chest was obtained at 1355 hours. The   patient is on a cardiac monitor. Mild pulmonary edema. The cardiac and   mediastinal contours and pulmonary vascularity are normal.  The bones and soft   tissues are grossly within normal limits. Medical Decision Making   I am the first provider for this patient. I reviewed the vital signs, available nursing notes, past medical history, past surgical history, family history and social history. Vital Signs-Reviewed the patient's vital signs. Patient Vitals for the past 12 hrs:   Temp Pulse Resp BP SpO2   03/01/22 1600  84 25 (!) 160/92 95 %   03/01/22 1555     94 %   03/01/22 1537  92  (!) 179/92    03/01/22 1533 98.6 °F (37 °C) 94 20  (!) 86 %   03/01/22 1326  83 16 121/65 (!) 85 %       Records Reviewed: Nursing Notes and Old Medical Records    Provider Notes (Medical Decision Making):   Patient presenting with hypoxia and shortness of breath over the past 1 month that is getting worse now with fatigue and falls. Differential includes pneumonia, COVID-19 infection, CHF and pulmonary edema, new onset COPD, PE. Plan will be to get labs, chest x-ray and consider CTA    ED Course:   Initial assessment performed. The patients presenting problems have been discussed, and they are in agreement with the care plan formulated and outlined with them. I have encouraged them to ask questions as they arise throughout their visit. ED Course as of 03/01/22 1601   Tue Mar 01, 2022   1545 Patient still saturating at 86 to 87% on 2 L so increased to 5 L. Besides glucose being slightly elevated, lab work otherwise unremarkable. Waiting on CTA.  [JS]      ED Course User Index  [JS] Basilia Rogers MD     Critical Care Time:   CRITICAL CARE NOTE :    2:46 PM    IMPENDING DETERIORATION -Airway, Respiratory and Cardiovascular  ASSOCIATED RISK FACTORS - Hypotension, Shock and Hypoxia  MANAGEMENT- Bedside Assessment and Supervision of Care  INTERPRETATION -  Xrays, CT Scan, ECG, Blood Pressure and Cardiac Output Measures   INTERVENTIONS - hemodynamic mngmt and vent mngmt  CASE REVIEW - Hospitalist/Intensivist, Nursing and Family  TREATMENT RESPONSE -Stable  PERFORMED BY - Self    NOTES   :    I have spent 50 minutes of critical care time involved in lab review, consultations with specialist, family decision- making, bedside attention and documentation. During this entire length of time I was immediately available to the patient . Disposition:    Admission Note:  Patient is being admitted to the hospital by Dr. Gomez Olivarez, Service: Hospitalist.  The results of their tests and reasons for their admission have been discussed with them and available family. They convey agreement and understanding for the need to be admitted and for their admission diagnosis. Diagnosis     Clinical Impression:   1. Acute respiratory failure with hypoxia (HCC)        Attestations:  Lora Angela M.D. Please note that this dictation was completed with [employer], the computer voice recognition software. Quite often unanticipated grammatical, syntax, homophones, and other interpretive errors are inadvertently transcribed by the computer software. Please disregard these errors. Please excuse any errors that have escaped final proofreading. Thank you.

## 2022-03-01 NOTE — ED NOTES
Unable to get temp in triage and pt placed on 2 L nasal cannula at this time d/t O2 sat 85 % in triage.

## 2022-03-02 NOTE — PROGRESS NOTES
Viri family friend called to check on pt's status. Report given. Pt notified. Viri requesting full updates especially from Case Management for discharge instructions. Other than the pt's son, Mignon Danielle is secondary caregiver.

## 2022-03-02 NOTE — PROGRESS NOTES
Received notification from bedside RN about patient with regards to: hypoxia, increasing O2 requirement  VS: /78, HR 70, RR 18, O2 sat 88% on NC 5 L    Intervention given: Combivent inh TID, Albuterol HFA PRN, NHHF to titrate to keep sats >92% ordered.    D dimer and CRP added to today's AM labs

## 2022-03-02 NOTE — PROGRESS NOTES
Took over care for Pt at 1800. Pt in bed resting quietly with neighbor who brought her in. Pt at dinner in room. Pt incontinent x 1 of urine. TRANSFER - OUT REPORT:    Verbal report given to Jose Anotnio RN(name) [facility]  being transferred to [facility](unit) for routine progression of care       Report consisted of patients Situation, Background, Assessment and   Recommendations(SBAR). Information from the following report(s) SBAR, Kardex, ED Summary, Intake/Output, MAR, Recent Results and Cardiac Rhythm Sinus rhythm was reviewed with the receiving nurse. Lines:   Peripheral IV 03/01/22 Anterior;Left;Proximal Antecubital (Active)   Site Assessment Clean, dry, & intact 03/01/22 1800   Phlebitis Assessment 0 03/01/22 1800   Infiltration Assessment 0 03/01/22 1800   Dressing Status Clean, dry, & intact 03/01/22 1800   Dressing Type Tape;Transparent 03/01/22 1800   Hub Color/Line Status Pink;Flushed 03/01/22 1800        Opportunity for questions and clarification was provided. Patient transported with:   Tech and all possessions.  Pt transported with 4L O2 NC

## 2022-03-02 NOTE — H&P
Hospitalist Admission Note    NAME: Kimi Dickson   :  1950   MRN:  [id number]     Date/Time:  3/1/2022 11:13 PM    Patient PCP: None  ________________________________________________________________________    My assessment of this patient's clinical condition and my plan of care is as follows. Assessment / Plan:  Acute hypoxic respiratory failure due to  Suspected acute pulmonary edema, rule out congestive heart failure  Rule out COVID-19  -Coming with shortness of breath, cough and phlegm  -CT shows evidence of pulmonary edema versus infection  -Rapid SARS-CoV-2 is negative and PCR sent  -proBNP is elevated at 538. Start Lasix 40 mg IV twice daily. Strict I's and O's, daily weights and low-salt diet. Check 2D echocardiogram.  -No leukocytosis or fever to suspect bacterial reported. Check procalcitonin level. If elevated, consider starting on antibiotics      Diabetes mellitus type 2  -Resume home Lantus. Start insulin sliding scale blood sugar checks. Check hemoglobin A1c. Hypertension  Dyslipidemia  -Continue home lisinopril, metoprolol, Lipitor      Code Status: Full code  Surrogate Decision Maker:    DVT Prophylaxis: Lovenox  GI Prophylaxis: not indicated    Baseline: From home, independent of ADLs        Subjective:   CHIEF COMPLAINT: Generalized weakness, fatigue    HISTORY OF PRESENT ILLNESS:     Kimi Dickson is a 66 y.o. female who presents with past medical history of diabetes mellitus, hypertension, dyslipidemia is coming the hospital chief complaints of generalized weakness, shortness of breath, cough and phlegm. Patient reportedly noticed a fentanyl about 6 days ago when he started having shortness of breath along with cough and small amount of whitish phlegm. She also reports generalized weakness. She also reports fall yesterday and was on the floor for close to 6 to 7 hours. Does not report any fever or chills.     On arrival to ED, noted to have hypoxemia with saturations of 80% on room air. On labs noted to have a proBNP of 538. Troponin 49. LFTs are normal.  Creatinine is normal.  CBC is normal.  CTA chest shows evidence of bilateral groundglass opacification due to edema pneumonia. We were asked to admit for work up and evaluation of the above problems. Past Medical History:   Diagnosis Date    Diabetes ([address]     Hypercholesterolemia     Hypertension         Past Surgical History:   Procedure Laterality Date    HX GYN      HX HYSTERECTOMY N/A        Social History     Tobacco Use    Smoking status: Former Smoker     Packs/day: 0.50     Years: 4.00     Pack years: 2.00     Quit date: 1975     Years since quittin.7    Smokeless tobacco: Never Used   Substance Use Topics    Alcohol use: No        Family History   Problem Relation Age of Onset    Cancer Mother     Diabetes Mother     Heart Disease Mother     Diabetes Sister     Diabetes Sister     Diabetes Sister     Diabetes Sister     Cancer Sister      No Known Allergies     Prior to Admission medications    Medication Sig Start Date End Date Taking? Authorizing Provider   benzonatate (Tessalon Perles) 100 mg capsule Take 1 Capsule by mouth three (3) times daily as needed for Cough for up to 7 days. 2/23/22 3/2/22  Bentley Lizama MD   glipiZIDE SR (GLUCOTROL XL) 10 mg CR tablet Take 10 mg by mouth daily. Provider, Historical   metoprolol succinate (TOPROL-XL) 50 mg XL tablet Take 50 mg by mouth daily. Provider, Historical   dulaglutide (TRULICITY) 1.5 OE/6.4 mL sub-q pen 1.5 mg by SubCUTAneous route every seven (7) days. Provider, Historical   metformin (GLUCOPHAGE) 500 mg tablet Take 1 tablet by mouth two (2) times daily (with meals). 14   Lieutenant Garima Avila MD   glucose blood VI test strips (ASCENSIA CONTOUR) strip Check blood glucose 2 times a day 14   Lieutenant Garima Avila MD   lisinopril (PRINIVIL, ZESTRIL) 10 mg tablet Take 1 tablet by mouth daily.  14   Fabi Avila, MD   Lancets [facility] THIN) misc Check glucose 2 times a day 9/3/14   Ruddy Avila MD   simvastatin (ZOCOR) 20 mg tablet Take 1 Tab by mouth nightly. 7/29/14   Ruddy Avila MD   ofloxacin (FLOXIN) 0.3 % otic solution Administer 5 Drops into each ear daily. 7/29/14   Marcia Reyez MD   aspirin 81 mg chewable tablet Take 81 mg by mouth daily. Provider, Historical       REVIEW OF SYSTEMS:     I am not able to complete the review of systems because:    The patient is intubated and sedated    The patient has altered mental status due to his acute medical problems    The patient has baseline aphasia from prior stroke(s)    The patient has baseline dementia and is not reliable historian    The patient is in acute medical distress and unable to provide information           Total of 12 systems reviewed as follows:       POSITIVE= underlined text  Negative = text not underlined  General:  fever, chills, sweats, generalized weakness, weight loss/gain,      loss of appetite   Eyes:    blurred vision, eye pain, loss of vision, double vision  ENT:    rhinorrhea, pharyngitis   Respiratory:   cough, sputum production, SOB, SALAS, wheezing, pleuritic pain   Cardiology:   chest pain, palpitations, orthopnea, PND, edema, syncope   Gastrointestinal:  abdominal pain , N/V, diarrhea, dysphagia, constipation, bleeding   Genitourinary:  frequency, urgency, dysuria, hematuria, incontinence   Muskuloskeletal :  arthralgia, myalgia, back pain  Hematology:  easy bruising, nose or gum bleeding, lymphadenopathy   Dermatological: rash, ulceration, pruritis, color change / jaundice  Endocrine:   hot flashes or polydipsia   Neurological:  headache, dizziness, confusion, focal weakness, paresthesia,     Speech difficulties, memory loss, gait difficulty  Psychological: Feelings of anxiety, depression, agitation    Objective:   VITALS:    Visit Vitals  /76 (BP 1 Location: Right upper arm)   Pulse 76   Temp 99 °F (37.2 °C)   Resp 19   Ht 5' (1.524 m)   Wt 65.8 kg (145 lb)   SpO2 90%   Breastfeeding No   BMI 28.32 kg/m²       PHYSICAL EXAM:    General:    Alert, cooperative, no distress, appears stated age. HEENT: Atraumatic, anicteric sclerae, pink conjunctivae     No oral ulcers, mucosa moist, throat clear, dentition fair  Neck:  Supple, symmetrical,  thyroid: non tender  Lungs:   Bilateral air entry present, crackles present, no wheezing  Chest wall:  No tenderness  No Accessory muscle use. Heart:   Regular  rhythm,  No  murmur   No edema  Abdomen:   Soft, non-tender. Not distended. Bowel sounds normal  Extremities: No cyanosis. No clubbing,      Skin turgor normal, Capillary refill normal, Radial dial pulse 2+  Skin:     Not pale. Not Jaundiced  No rashes   Psych:  Good insight. Not depressed. Not anxious or agitated. Neurologic: EOMs intact. No facial asymmetry. No aphasia or slurred speech. Symmetrical strength, Sensation grossly intact. Alert and oriented X 4.     _______________________________________________________________________  Care Plan discussed with:    Comments   Patient y    Family      RN y    Care Manager                    Consultant:      _______________________________________________________________________  Expected  Disposition:   Home with Family y   HH/PT/OT/RN    SNF/LTC    CARLITOS    ________________________________________________________________________  TOTAL TIME:  61  Minutes    Critical Care Provided     Minutes non procedure based      Comments    y Reviewed previous records   >50% of visit spent in counseling and coordination of care y Discussion with patient and/or family and questions answered       ________________________________________________________________________  Signed: Shay Jain MD    Procedures: see electronic medical records for all procedures/Xrays and details which were not copied into this note but were reviewed prior to creation of Plan.     LAB DATA REVIEWED:    Recent Results (from the past 24 hour(s))   EKG, 12 LEAD, INITIAL    Collection Time: 03/01/22  1:38 PM   Result Value Ref Range    Ventricular Rate 82 BPM    Atrial Rate 82 BPM    P-R Interval 172 ms    QRS Duration 74 ms    Q-T Interval 360 ms    QTC Calculation (Bezet) 420 ms    Calculated P Axis 22 degrees    Calculated R Axis -38 degrees    Calculated T Axis 37 degrees    Diagnosis       Sinus rhythm with premature atrial complexes with aberrant conduction  Left axis deviation  Minimal voltage criteria for LVH, may be normal variant  Anterolateral infarct (cited on or before 23-FEB-2022)  When compared with ECG of 23-FEB-2022 13:53,  aberrant conduction is now present  Questionable change in initial forces of Anteroseptal leads     CBC WITH AUTOMATED DIFF    Collection Time: 03/01/22  2:20 PM   Result Value Ref Range    WBC 6.8 3.6 - 11.0 K/uL    RBC 4.59 3.80 - 5.20 M/uL    HGB 13.3 11.5 - 16.0 g/dL    HCT 38.5 35.0 - 47.0 %    MCV 83.9 80.0 - 99.0 FL    MCH 29.0 26.0 - 34.0 PG    MCHC 34.5 30.0 - 36.5 g/dL    RDW 12.2 11.5 - 14.5 %    PLATELET 748 852 - 350 K/uL    MPV 9.1 8.9 - 12.9 FL    NRBC 0.0 0  WBC    ABSOLUTE NRBC 0.00 0.00 - 0.01 K/uL    NEUTROPHILS 86 (H) 32 - 75 %    LYMPHOCYTES 7 (L) 12 - 49 %    MONOCYTES 5 5 - 13 %    EOSINOPHILS 0 0 - 7 %    BASOPHILS 0 0 - 1 %    IMMATURE GRANULOCYTES 2 (H) 0.0 - 0.5 %    ABS. NEUTROPHILS 5.9 1.8 - 8.0 K/UL    ABS. LYMPHOCYTES 0.5 (L) 0.8 - 3.5 K/UL    ABS. MONOCYTES 0.3 0.0 - 1.0 K/UL    ABS. EOSINOPHILS 0.0 0.0 - 0.4 K/UL    ABS. BASOPHILS 0.0 0.0 - 0.1 K/UL    ABS. IMM.  GRANS. 0.1 (H) 0.00 - 0.04 K/UL    DF SMEAR SCANNED      RBC COMMENTS NORMOCYTIC, NORMOCHROMIC     METABOLIC PANEL, COMPREHENSIVE    Collection Time: 03/01/22  2:20 PM   Result Value Ref Range    Sodium 138 136 - 145 mmol/L    Potassium 4.7 3.5 - 5.1 mmol/L    Chloride 104 97 - 108 mmol/L    CO2 24 21 - 32 mmol/L    Anion gap 10 5 - 15 mmol/L    Glucose 382 (H) 65 - 100 mg/dL    BUN 32 (H) 6 - 20 MG/DL    Creatinine 0.73 0.55 - 1.02 MG/DL    BUN/Creatinine ratio 44 (H) 12 - 20      GFR est AA >60 >60 ml/min/1.73m2    GFR est non-AA >60 >60 ml/min/1.73m2    Calcium 9.2 8.5 - 10.1 MG/DL    Bilirubin, total 0.7 0.2 - 1.0 MG/DL    ALT (SGPT) 18 12 - 78 U/L    AST (SGOT) 33 15 - 37 U/L    Alk.  phosphatase 120 (H) 45 - 117 U/L    Protein, total 7.6 6.4 - 8.2 g/dL    Albumin 2.6 (L) 3.5 - 5.0 g/dL    Globulin 5.0 (H) 2.0 - 4.0 g/dL    A-G Ratio 0.5 (L) 1.1 - 2.2     TROPONIN-HIGH SENSITIVITY    Collection Time: 03/01/22  2:20 PM   Result Value Ref Range    Troponin-High Sensitivity 14 0 - 51 ng/L   NT-PRO BNP    Collection Time: 03/01/22  2:20 PM   Result Value Ref Range    NT pro- (H) <125 PG/ML   COVID-19 RAPID TEST    Collection Time: 03/01/22  3:35 PM   Result Value Ref Range    Specimen source Nasopharyngeal      COVID-19 rapid test Detected (A) NOTD     GLUCOSE, POC    Collection Time: 03/01/22  3:44 PM   Result Value Ref Range    Glucose (POC) 379 (H) 65 - 117 mg/dL    Performed by Juwan FERNANDO    GLUCOSE, POC    Collection Time: 03/01/22  4:52 PM   Result Value Ref Range    Glucose (POC) 285 (H) 65 - 117 mg/dL    Performed by Ita Moreira (TRJACOB RN)    GLUCOSE, POC    Collection Time: 03/01/22 10:20 PM   Result Value Ref Range    Glucose (POC) 293 (H) 65 - 117 mg/dL    Performed by Grace Galicia (TRJACOB RN)

## 2022-03-02 NOTE — PROGRESS NOTES
Hospitalist Progress Note    NAME: Rani Bishop   :  1950   MRN:  [id number]       Assessment / Plan:  Acute hypoxic respiratory failure due to  COVID-19 Pneumonia POA  -Coming with shortness of breath, cough and phlegm  -CT shows evidence of pulmonary edema versus infection  -Rapid COVID-19 test is positive  -Patient is unsure if she is vaccinated  -She is not volume overloaded  -We will start dexamethasone 6 mg daily  -Oxygen supplementation, currently on 8 L  -Pharmacy consult for baricitinib  -CRP and D-dimer levels are pending  -We will check procalcitonin levels        Diabetes mellitus type 2  -Hemoglobin A1c is 11.9  -Discontinue home dose of Lantus and placed on NPH 15 units twice daily while patient is on steroids  -We will do sliding scale insulin     Hypertension  Dyslipidemia  -Continue home lisinopril, metoprolol, Lipitor        Code Status: Full code  Surrogate Decision Maker:     DVT Prophylaxis: Lovenox  GI Prophylaxis: not indicated     Baseline: From home, independent of ADLs    Recommended Disposition: Home w/Family     Subjective:     Chief Complaint / Reason for Physician Visit  Oxygen requirement increased to 8 L, patient is pleasant, denies any worsening shortness of breath subjectively. Denies any cough or sputum production. Discussed with RN events overnight. Review of Systems:  Symptom Y/N Comments  Symptom Y/N Comments   Fever/Chills n   Chest Pain n    Poor Appetite n   Edema n    Cough n   Abdominal Pain n    Sputum n   Joint Pain     SOB/SALAS n   Pruritis/Rash     Nausea/vomit n   Tolerating PT/OT     Diarrhea n   Tolerating Diet     Constipation n   Other       Could NOT obtain due to:      Objective:     VITALS:   Last 24hrs VS reviewed since prior progress note.  Most recent are:  Patient Vitals for the past 24 hrs:   Temp Pulse Resp BP SpO2   22 1143 98.8 °F (37.1 °C) 70 20 130/65 95 %   22 0839 98 °F (36.7 °C) 71 20 131/66 (!) 88 %   22 0822     93 %   03/02/22 0556     94 %   03/02/22 0424 98.7 °F (37.1 °C) 70 18 134/78 (!) 84 %   03/02/22 0000  77      03/01/22 2000 99 °F (37.2 °C) 76 19 136/76 90 %   03/01/22 1800 98.1 °F (36.7 °C) 86 21 111/76 (!) 88 %   03/01/22 1633  83 (!) 35 107/65 91 %   03/01/22 1618  83 (!) 38 (!) 109/52 91 %   03/01/22 1603  84  (!) 112/59    03/01/22 1600  84 25 (!) 160/92 95 %   03/01/22 1555     94 %   03/01/22 1541  90 17 (!) 179/92 92 %   03/01/22 1537  92  (!) 179/92    03/01/22 1533 98.6 °F (37 °C) 94 20  (!) 86 %       Intake/Output Summary (Last 24 hours) at 3/2/2022 1354  Last data filed at 3/1/2022 1800  Gross per 24 hour   Intake 0 ml   Output    Net 0 ml        PHYSICAL EXAM:  General: WD, WN. Alert, cooperative, no acute distress    EENT:  EOMI. Anicteric sclerae. MMM  Resp:  CTA bilaterally, no wheezing or rales. No accessory muscle use  CV:  Regular  rhythm,  No edema  GI:  Soft, Non distended, Non tender.  +Bowel sounds  Neurologic:  Alert and oriented X 3, normal speech,   Psych:   Good insight. Not anxious nor agitated  Skin:  No rashes. No jaundice    Reviewed most current lab test results and cultures  YES  Reviewed most current radiology test results   YES  Review and summation of old records today    NO  Reviewed patient's current orders and MAR    YES  PMH/SH reviewed - no change compared to H&P  ________________________________________________________________________  Care Plan discussed with:    Comments   Patient x    Family      RN x    Care Manager     Consultant                       x Multidiciplinary team rounds were held today with , nursing, pharmacist and clinical coordinator. Patient's plan of care was discussed; medications were reviewed and discharge planning was addressed.      ________________________________________________________________________  Total NON critical care TIME:  30   Minutes    Total CRITICAL CARE TIME Spent:   Minutes non procedure based      Comments   >50% of visit spent in counseling and coordination of care     ________________________________________________________________________  Ralph Girard MD     Procedures: see electronic medical records for all procedures/Xrays and details which were not copied into this note but were reviewed prior to creation of Plan. LABS:  I reviewed today's most current labs and imaging studies.   Pertinent labs include:  Recent Labs     03/02/22  0333 03/01/22  1420   WBC 7.7 6.8   HGB 13.2 13.3   HCT 39.5 38.5    293     Recent Labs     03/02/22  0333 03/01/22  1420    138   K 3.4* 4.7    104   CO2 28 24   * 382*   BUN 35* 32*   CREA 0.64 0.73   CA 9.3 9.2   ALB  --  2.6*   TBILI  --  0.7   ALT  --  18       Signed: Ralph Girard MD

## 2022-03-02 NOTE — PROGRESS NOTES
Pharmacy Medication Reconciliation     The patient was NOT interviewed regarding current PTA medication list, use and drug allergies. Allergy Update: Patient has no known allergies. Recommendations/Findings: The following amendments were made to the patient's active medication list on file at [facility]:   1) Additions:   None  2) Deletions:   Aspirin 81 mg  Simvastatin 20 mg  Dulaglutide 1.5mg/0.5 mL  Glipizide SR 10 mg  Glucose test strips  Lancets  Lisinopril 10 mg  Metformin 500 mg  Metoprolol XL 50 mg  Ofloxacin 0.3% eye drops  3) Changes:   None    Pertinent Findings: Attempted to contact patient on the phone due to COVID precautions but did not answer. Then attempted to contact a relative listed on demographics, but did not answer as well. Finally, contacted [facility] pharmacy but they only filled one medication, benzonatate, for patient. Removed all of the medications on the PTA list. However, it is worth noting that patient has been on medication for diabetes and hypertension. Clarified PTA med list with [facility] pharmacy on the telephone. PTA medication list was corrected to the following:     Prior to Admission Medications   Prescriptions Last Dose Informant Taking?   benzonatate (Tessalon Perles) 100 mg capsule  Other Yes   Sig: Take 1 Capsule by mouth three (3) times daily as needed for Cough for up to 7 days.       Facility-Administered Medications: None        Thank you,  Aaron Pagan

## 2022-03-03 NOTE — PROGRESS NOTES
9:27 - Notified by Dr. Marte Console that pt's O2 sat 73% on 8 L when he was in room assessing pt. Dr. Marte Console increased pt to 15 L.     9:30 - Upon reassessment by RN, pt O2 sat 95% on the 15 L. Pt denies having dyspnea. 9:33 - Notified Respiratory Therapist Yahaira Shields of pt's current status. Per Yahaira Shields, pt is ok to stay on med tele for now. If for any reason she needs to go higher on O2, then she'll have to be put on heated high flow and will need to be transferred to another unit.

## 2022-03-03 NOTE — PROGRESS NOTES
Hospital follow-up PCP transitional care appointment has been scheduled with Dr. Rene Huffman on 4/20/22 at 10:00am. Pending patient discharge. Patient requested this practice.   Jaylan Whitney, Care Management Specialist

## 2022-03-03 NOTE — PROGRESS NOTES
Hospitalist Progress Note    NAME: Ayde Masters   :  1950   MRN:  [id number]       Assessment / Plan:  Acute hypoxic respiratory failure due to  COVID-19 Pneumonia POA  -Coming with shortness of breath, cough and phlegm  -CT shows evidence of pulmonary edema versus infection  -Rapid COVID-19 test is positive  -Patient is unsure if she is vaccinated  -She is not volume overloaded  -Continue dexamethasone 6 mg daily for 10 days started 3/2  -Patient was on 8 L oxygen and oxygen saturation in 70s, I increased oxygenation to 15 L after which saturation improved to 93%  -Baricitinibeight four 2 weeks 3/2  -CRP and D-dimer levels are pending  -Procalcitonin level is only 0.1        Diabetes mellitus type 2  -Hemoglobin A1c is 11.9  -Increase NPH dose to 20 units BID  -We will c/w sliding scale insulin     Hypertension  Dyslipidemia  -Continue home lisinopril, metoprolol, Lipitor        Code Status: Full code  Surrogate Decision Maker:     DVT Prophylaxis: Lovenox  GI Prophylaxis: not indicated     Baseline: From home, independent of ADLs    Recommended Disposition: Home w/Family     Subjective:     Chief Complaint / Reason for Physician Visit  Oxygen requirement increased to 15 L, patient is pleasant, denies any worsening shortness of breath subjectively. Denies any cough or sputum production. Discussed with RN events overnight. Review of Systems:  Symptom Y/N Comments  Symptom Y/N Comments   Fever/Chills n   Chest Pain n    Poor Appetite n   Edema n    Cough n   Abdominal Pain n    Sputum n   Joint Pain     SOB/SALAS n   Pruritis/Rash     Nausea/vomit n   Tolerating PT/OT     Diarrhea n   Tolerating Diet     Constipation n   Other       Could NOT obtain due to:      Objective:     VITALS:   Last 24hrs VS reviewed since prior progress note.  Most recent are:  Patient Vitals for the past 24 hrs:   Temp Pulse Resp BP SpO2   22 1143     92 %   22 1130 98.6 °F (37 °C) 76 18 116/76 96 %   22 7892     95 %   03/03/22 0836     94 %   03/03/22 0808 97.7 °F (36.5 °C) 84 18 120/76 98 %   03/02/22 2147     91 %   03/02/22 1945 98.3 °F (36.8 °C) 72 20 117/70 96 %   03/02/22 1643 98.4 °F (36.9 °C) 68 20 111/68 93 %   03/02/22 1522     93 %     No intake or output data in the 24 hours ending 03/03/22 1313     PHYSICAL EXAM:  General: WD, WN. Alert, cooperative, no acute distress    EENT:  EOMI. Anicteric sclerae. MMM  Resp:  CTA bilaterally, no wheezing or rales. No accessory muscle use  CV:  Regular  rhythm,  No edema  GI:  Soft, Non distended, Non tender.  +Bowel sounds  Neurologic:  Alert and oriented X 3, normal speech,   Psych:   Good insight. Not anxious nor agitated  Skin:  No rashes. No jaundice    Reviewed most current lab test results and cultures  YES  Reviewed most current radiology test results   YES  Review and summation of old records today    NO  Reviewed patient's current orders and MAR    YES  PMH/ reviewed - no change compared to H&P  ________________________________________________________________________  Care Plan discussed with:    Comments   Patient x    Family      RN x    Care Manager     Consultant                       x Multidiciplinary team rounds were held today with , nursing, pharmacist and clinical coordinator. Patient's plan of care was discussed; medications were reviewed and discharge planning was addressed. ________________________________________________________________________  Total NON critical care TIME:  30   Minutes    Total CRITICAL CARE TIME Spent:   Minutes non procedure based      Comments   >50% of visit spent in counseling and coordination of care     ________________________________________________________________________  Dana Valenzuela MD     Procedures: see electronic medical records for all procedures/Xrays and details which were not copied into this note but were reviewed prior to creation of Plan.       LABS:  I reviewed today's most current labs and imaging studies.   Pertinent labs include:  Recent Labs     03/03/22  0442 03/02/22  0333 03/01/22  1420   WBC 4.8 7.7 6.8   HGB 13.1 13.2 13.3   HCT 38.4 39.5 38.5    273 293     Recent Labs     03/03/22 0442 03/02/22  0333 03/01/22  1420    140 138   K 3.6 3.4* 4.7    106 104   CO2 27 28 24   * 179* 382*   BUN 36* 35* 32*   CREA 0.63 0.64 0.73   CA 9.1 9.3 9.2   ALB  --   --  2.6*   TBILI  --   --  0.7   ALT  --   --  18       Signed: Lalo Kraus MD

## 2022-03-03 NOTE — PROGRESS NOTES
Care Management:    Transition of Care Plan:    RUR:14%  Disposition:Home with family. Follow up appointments:set up with  new PCP at [facility] for April 20th. She has paperwork that must be sent in by 4/ 14 or appointment will be cancelled. I have called her friend Viri and let her know this and she says she will take care of it and make sure paperwork is done. DME needed: She has no DME at home. Transportation at Discharge:her friend Alcira Ni or means to access home:   Viri     IM Medicare Letter:She will need second IM letter. Is patient a BCPI-A Bundle:     N/A      If yes, was Bundle Letter given?:    Is patient a [place] and connected with the [address] [place] [address]? N/A               If yes, was [place] transfer form completed and VA notified? Caregiver Contact:friend Viri Bishop . Viri has looked out for her the past 30 years. Also her niece Garcia Altagracia . Discharge Caregiver contacted prior to discharge? Viri  Care Conference needed?:  Not at this time. Reason for Admission:   Acute Pulmonary Edema/Covid. Hx of DM, HTN. Patient told care manager she does not take any medication. Has not taken anything for her DM for a couple of years. RUR Score: 14%                 PCP: First and Last name:  Will need PCP set up close to her home. Name of Practice:    Are you a current patient: Yes/No:    Approximate date of last visit:    Can you participate in a virtual visit if needed:     Do you (patient/family) have any concerns for transition/discharge? Will need a new PCP. Concerned about cost of medical bills and medications. Plan for utilizing home health:   TBD    Current Advanced Directive/Advance Care Plan:  Full Code      Healthcare Decision Maker:   Garcia Frias - Reji  Kathleen Livers - life long friend. Care Management Interventions  PCP Verified by CM: No  Mode of Transport at Discharge:  Other (see comment)  Support Systems: Friend/Neighbor  Confirm Follow Up Transport: Friends  Discharge Location  Patient Expects to be Discharged to[de-identified] Home with family assistance     CM met with patient at bedside and was able to confirm demographics. Patient lives in a single story home and her son lives with her. Son works and is gone most of the time. Patient says she is independent with ADL's and she likes to walk. She has 4 cats. She has no DME. She has a Food Stamp card she says and it really helps with her grocery shopping. She uses [facility] but has not been getting any med's for a few years. She does not currently have a PCP . Her friend Viri wanted to look into [facility], she says the office is close to where to patient lives. CM will look into this. Viri has been looking after patient for 30 years and CM has patients consent to talk with her.      Rachel Richardson RN AC [id number]

## 2022-03-04 NOTE — PROGRESS NOTES
End of Shift Note    Bedside shift change report given to Jere Carrillo RN (oncoming nurse) by Marcia Martinez RN (offgoing nurse). Report included the following information SBAR, Kardex and MAR    Shift worked:  7a - 7p     Shift summary and any significant changes:     -Pt desatted to 73% on 8L O2 this morning while Dr. Deb Brooks was rounding. O2 increased to 15 L by Dr. Deb Brooks. See my note from this morning. Pt remained on 15 L for the rest of the shift.    -Pt denied having pain.   -Order for turn team put in. Pt can turn herself, but she doesn't. Concerns for physician to address:       Zone phone for oncoming shift:          Activity:  Activity Level: Bed Rest  Number times ambulated in hallways past shift: 0  Number of times OOB to chair past shift: 0    Cardiac:   Cardiac Monitoring: Yes      Cardiac Rhythm: Sinus Rhythm    Access:   Current line(s): PIV     Genitourinary:   Urinary status: voiding and external catheter    Respiratory:   O2 Device: Hi flow nasal cannula  Chronic home O2 use?: NO  Incentive spirometer at bedside: NO       GI:     Current diet:  ADULT DIET Regular; 4 carb choices (60 gm/meal)  Passing flatus: YES  Tolerating current diet: YES       Pain Management:   Patient states pain is manageable on current regimen: YES    Skin:  Mike Score: 16  Interventions: turn team, float heels, increase time out of bed, PT/OT consult, internal/external urinary devices and nutritional support     Patient Safety:  Fall Score:  Total Score: 5  Interventions: bed/chair alarm, gripper socks, pt to call before getting OOB and stay with me (per policy)  High Fall Risk: Yes    Length of Stay:  Expected LOS: - - -  Actual LOS: 2      Marcia Martinez RN

## 2022-03-04 NOTE — PROGRESS NOTES
Transition of Care Plan:     RUR: 15% \"mod risk\"  Disposition: Home with follow-up appts   Follow up appointments: PCP & specialists as indicated  DME needed: Possible home 02  Transportation at Discharge: Friend (Raegan Brody)  [place] or means to access home: Friend has access  IM Medicare Letter:She will need second IM letter. Is patient a BCPI-A Bundle:     N/A                 If yes, was Bundle Letter given?:    Is patient a  and connected with the [place]? N/A               If yes, was [employer] transfer form completed and VA notified? Caregiver Contact: Kayley Pierce, [phone]  Discharge Caregiver contacted prior to discharge? Viri  Care Conference needed?:    Reviewed pt's chart. Discharge plan discussed during IDR; pt anticipated to be here through the weekend. Pt requiring 15L HiFlow. Could benefit from PT/OT evals once appropriate. Anticipate needed home 02 challenge if unable to wean. Will continue to follow. Assessing CM secured new pt PCP appt; 4/20. CM delivered new pt paperwork at bedside.        Kalin Molina MSW  Care Management

## 2022-03-04 NOTE — PROGRESS NOTES
Problem: Nutrition Deficits  Goal: Optimize nutrtional status  Outcome: Progressing Towards Goal     Problem: Falls - Risk of  Goal: *Absence of Falls  Description: Document Virlinda Gamma Fall Risk and appropriate interventions in the flowsheet. Outcome: Progressing Towards Goal  Note: Fall Risk Interventions:  Mobility Interventions: Communicate number of staff needed for ambulation/transfer    Mentation Interventions: Bed/chair exit alarm    Medication Interventions: Bed/chair exit alarm    Elimination Interventions: Call light in reach    History of Falls Interventions:  Investigate reason for fall

## 2022-03-04 NOTE — PROGRESS NOTES
End of Shift Note    Shift change report given to Zachary Lofton RN (oncoming nurse) by Mine Baird RN (offgoing nurse). Report included the following information SBAR and Kardex    Shift worked:  [id number]     Shift summary and any significant changes:     Patient remains on 15L hi flow oxygen. Patient up in chair at this time.       Concerns for physician to address:  none     Zone phone for oncoming shift:   [phone]       Mine Baird RN

## 2022-03-04 NOTE — PROGRESS NOTES
Hospitalist Progress Note    NAME: Meri Paz   :  1950   MRN:  [id number]       Assessment / Plan:  Acute hypoxic respiratory failure due to  COVID-19 Pneumonia POA  -Coming with shortness of breath, cough and phlegm  -CT shows evidence of pulmonary edema versus infection  -Rapid COVID-19 test is positive  -Patient is unsure if she is vaccinated  -She is not volume overloaded  -Continue dexamethasone 6 mg daily for 10 days started 3/2  -Patient remains on 15 L  -Baricitinib twice daily for weeks started 3/2  -Procalcitonin level is only 0.1        Diabetes mellitus type 2  -Hemoglobin A1c is 11.9  -Increase NPH dose to 25 units BID  -We will c/w sliding scale insulin     Hypertension  Dyslipidemia  -Continue home lisinopril, metoprolol, Lipitor        Code Status: Full code  Surrogate Decision Maker:     DVT Prophylaxis: Lovenox  GI Prophylaxis: not indicated     Baseline: From home, independent of ADLs    Recommended Disposition: Home w/Family     Subjective:     Chief Complaint / Reason for Physician Visit  Remains on 15 L, denies any active complaints. No events reported overnight, blood sugars are still elevated    Review of Systems:  Symptom Y/N Comments  Symptom Y/N Comments   Fever/Chills n   Chest Pain n    Poor Appetite n   Edema n    Cough n   Abdominal Pain n    Sputum n   Joint Pain     SOB/SALAS n   Pruritis/Rash     Nausea/vomit n   Tolerating PT/OT     Diarrhea n   Tolerating Diet     Constipation n   Other       Could NOT obtain due to:      Objective:     VITALS:   Last 24hrs VS reviewed since prior progress note.  Most recent are:  Patient Vitals for the past 24 hrs:   Temp Pulse Resp BP SpO2   22 1314  71  (!) 100/54    22 1310    (!) 82/47    22 1309  71  (!) 94/46 (!) 82 %   22 1124 98.8 °F (37.1 °C) 67 20 120/65 92 %   22 0806 97.9 °F (36.6 °C) 62 18 (!) 91/58 90 %   22 0400  (!) 57      22 0000  61      22 2322 98.2 °F (36.8 °C)  17 (!) 153/72 94 %   03/03/22 2037 98.2 °F (36.8 °C) 65  108/64 92 %   03/03/22 2000  68    03/03/22 1941     90 %   03/03/22 1407 98.4 °F (36.9 °C) 66 18 132/67 94 %   03/03/22 1356     95 %     No intake or output data in the 24 hours ending 03/04/22 1352     PHYSICAL EXAM:  General: WD, WN. Alert, cooperative, no acute distress    EENT:  EOMI. Anicteric sclerae. MMM  Resp:  CTA bilaterally, no wheezing or rales. No accessory muscle use  CV:  Regular  rhythm,  No edema  GI:  Soft, Non distended, Non tender.  +Bowel sounds  Neurologic:  Alert and oriented X 3, normal speech,   Psych:   Good insight. Not anxious nor agitated  Skin:  No rashes. No jaundice    Reviewed most current lab test results and cultures  YES  Reviewed most current radiology test results   YES  Review and summation of old records today    NO  Reviewed patient's current orders and MAR    YES  PMH/ reviewed - no change compared to H&P  ________________________________________________________________________  Care Plan discussed with:    Comments   Patient x    Family      RN x    Care Manager     Consultant                       x Multidiciplinary team rounds were held today with , nursing, pharmacist and clinical coordinator. Patient's plan of care was discussed; medications were reviewed and discharge planning was addressed. ________________________________________________________________________  Total NON critical care TIME:  30   Minutes    Total CRITICAL CARE TIME Spent:   Minutes non procedure based      Comments   >50% of visit spent in counseling and coordination of care     ________________________________________________________________________  Karli Oliva MD     Procedures: see electronic medical records for all procedures/Xrays and details which were not copied into this note but were reviewed prior to creation of Plan.       LABS:  I reviewed today's most current labs and imaging studies. Pertinent labs include:  Recent Labs     03/04/22 0248 03/03/22 0442 03/02/22  0333   WBC 7.6 4.8 7.7   HGB 12.4 13.1 13.2   HCT 36.3 38.4 39.5    266 273     Recent Labs     03/04/22 0248 03/03/22 0442 03/02/22  0333 03/01/22  1420 03/01/22  1420    139 140   < > 138   K 3.6 3.6 3.4*   < > 4.7    103 106   < > 104   CO2 27 27 28   < > 24   * 187* 179*   < > 382*   BUN 36* 36* 35*   < > 32*   CREA 0.58 0.63 0.64   < > 0.73   CA 8.5 9.1 9.3   < > 9.2   ALB  --   --   --   --  2.6*   TBILI  --   --   --   --  0.7   ALT  --   --   --   --  18    < > = values in this interval not displayed.        Signed: Krish Ramon MD

## 2022-03-05 NOTE — PROGRESS NOTES
1152 RR called for O2 saturation 80-84% not responsive to increasing HFNC to 15L. Initial BP 92/54(68). HR 68. Pt lethargic, but A&Ox4. Nonrebreather added and O2 saturations increased to 92% /58(84).

## 2022-03-05 NOTE — PROGRESS NOTES
1255- Pt arrived to room. VSS. SATS stable on 15 L HF + NRB. Rt notified of need for HHF.     1900- Report given to oncoming nurse by Nathaly Sanchez RN.

## 2022-03-05 NOTE — PROGRESS NOTES
Hospitalist Progress Note    NAME: Sha Rubi   :  1950   MRN:  [id number]       Assessment / Plan:  Acute hypoxic respiratory failure due to  COVID-19 Pneumonia POA  -Coming with shortness of breath, cough and phlegm  -CT shows evidence of pulmonary edema versus infection  -Rapid COVID-19 test is positive  -Patient is unsure if she is vaccinated  -She is not volume overloaded  -Continue dexamethasone 6 mg daily for 10 days started 3/2  -Patient remains on 13L  -Baricitinib twice daily for weeks started 3/2  -Procalcitonin level is only 0.1        Diabetes mellitus type 2  -Hemoglobin A1c is 11.9  -Continue NPH dose to 25 units BID  -We will c/w sliding scale insulin     Hypertension  Dyslipidemia  -Continue home lisinopril, metoprolol, Lipitor        Code Status: Full code  Surrogate Decision Maker:     DVT Prophylaxis: Lovenox  GI Prophylaxis: not indicated     Baseline: From home, independent of ADLs    Recommended Disposition: Home w/Family     Subjective:     Chief Complaint / Reason for Physician Visit  Remains on 13 L, denies any active complaints. No events reported overnight, blood sugars are still elevated    Review of Systems:  Symptom Y/N Comments  Symptom Y/N Comments   Fever/Chills n   Chest Pain n    Poor Appetite n   Edema n    Cough n   Abdominal Pain n    Sputum n   Joint Pain     SOB/SALAS n   Pruritis/Rash     Nausea/vomit n   Tolerating PT/OT     Diarrhea n   Tolerating Diet     Constipation n   Other       Could NOT obtain due to:      Objective:     VITALS:   Last 24hrs VS reviewed since prior progress note.  Most recent are:  Patient Vitals for the past 24 hrs:   Temp Pulse Resp BP SpO2   22 0229 98.4 °F (36.9 °C) 66 18 139/80 93 %   22 2310 98.4 °F (36.9 °C) 65 18 (!) 147/75 93 %   22 2032 98.6 °F (37 °C) 87  (!) 143/76 92 %   22 98 °F (36.7 °C) 66  124/65 95 %   22 1447     95 %   22 1314  71  (!) 100/54    22 1310    (!) 82/47    03/04/22 1309  71  (!) 94/46 (!) 82 %   03/04/22 1124 98.8 °F (37.1 °C) 67 20 120/65 92 %     No intake or output data in the 24 hours ending 03/05/22 1047     PHYSICAL EXAM:  General: WD, WN. Alert, cooperative, no acute distress    EENT:  EOMI. Anicteric sclerae. MMM  Resp:  CTA bilaterally, no wheezing or rales. No accessory muscle use  CV:  Regular  rhythm,  No edema  GI:  Soft, Non distended, Non tender.  +Bowel sounds  Neurologic:  Alert and oriented X 3, normal speech,   Psych:   Good insight. Not anxious nor agitated  Skin:  No rashes. No jaundice    Reviewed most current lab test results and cultures  YES  Reviewed most current radiology test results   YES  Review and summation of old records today    NO  Reviewed patient's current orders and MAR    YES  PMH/ reviewed - no change compared to H&P  ________________________________________________________________________  Care Plan discussed with:    Comments   Patient x    Family      RN x    Care Manager     Consultant                        Multidiciplinary team rounds were held today with , nursing, pharmacist and clinical coordinator. Patient's plan of care was discussed; medications were reviewed and discharge planning was addressed. ________________________________________________________________________  Total NON critical care TIME:  30   Minutes    Total CRITICAL CARE TIME Spent:   Minutes non procedure based      Comments   >50% of visit spent in counseling and coordination of care     ________________________________________________________________________  Hannah Perales MD     Procedures: see electronic medical records for all procedures/Xrays and details which were not copied into this note but were reviewed prior to creation of Plan. LABS:  I reviewed today's most current labs and imaging studies.   Pertinent labs include:  Recent Labs     03/05/22  0157 03/04/22  0248 03/03/22  0442   WBC 9.4 7.6 4.8   HGB 12.3 12.4 13.1   HCT 35.9 36.3 38.4    283 266     Recent Labs     03/05/22  0157 03/04/22  0248 03/03/22  0442    139 139   K 4.0 3.6 3.6    106 103   CO2 28 27 27   * 140* 187*   BUN 31* 36* 36*   CREA 0.62 0.58 0.63   CA 8.2* 8.5 9.1       Signed: Lalo Kraus MD

## 2022-03-05 NOTE — PROGRESS NOTES
Rapid response called at 1159, patient is Covid positive. Oxygen sats low, added NRB for oxygen support. \"I just want to eat. \"  Dr. Arpan Benavidez had previously seen patient. To transfer to PCU for higher level of care. Supervisor here, bed available. RN now calling report. 1242  Transferred via bed with oxygen and monitor. Assisted to bed in room 2250. Remained in NSR in 60s. Gabriela New at bedside assisting patient.

## 2022-03-06 NOTE — PROGRESS NOTES
Sp[place]ual [place]  [facility] [place] Claudell De[place]      MRN: [id number]  AGE: 66 y.o. SEX: female  Mu-ism Affiliation: No Adventist   Language: English     3/6/2022     Total Time (in minutes): 10     Spiritual Assessment begun in [facility] PROGRESSIVE CARE through conversation with:         [x]Patient        [] Family    [] Friend(s)        Reason for Consult: Initial/Spiritual assessment, patient floor     Spiritual beliefs: (Please include comment if needed)     [] Identifies with a thomas tradition:         [] Supported by a thomas community:            [] Claims no spiritual orientation:           [] Seeking spiritual identity:                [] Adheres to an individual form of spirituality:           [x] Not able to assess:                           Identified resources for coping:      [] Prayer                               [] Music                  [] Guided Imagery     [] Family/friends                 [] Pet visits     [] Devotional reading                         [x] Unknown     [] Other:                                             Interventions offered during this visit: (See comments for more details)    Patient Interventions: Other (comment) (Attempt)           Plan of Care:     [] Support spiritual and/or cultural needs    [] Support AMD and/or advance care planning process      [] Support grieving process   [] Coordinate Rites and/or Rituals    [] Coordination with community clergy   [] No spiritual needs identified at this time   [] Detailed Plan of Care below (See Comments)  [] Make referral to Music Therapy  [] Make referral to Pet Therapy     [] Make referral to Addiction services  [] Make referral to [facility]  [] Make referral to Spiritual Care Partner  [] No future visits requested        [] Contact Spiritual Care for further referrals     Comments:  Patient was on contact restriction at time of visit. So  called her room on Prog.  Care unit, room 2249, for initial spiritual assessment. She did not answer her phone. A peek through her glass window revealed patient appeared to be sleeping comfortably. Please contact spiritual care for further referrals. Visited by: Conner Weaver.    Paging Service: [phone])

## 2022-03-06 NOTE — PROGRESS NOTES
Hospitalist Progress Note    NAME: Tara Patton   :  1950   MRN:  [id number]       Assessment / Plan:  Acute hypoxic respiratory failure due to  COVID-19 Pneumonia POA  -Coming with shortness of breath, cough and phlegm  -CT shows evidence of pulmonary edema versus infection  -Rapid COVID-19 test is positive  -Patient is unsure if she is vaccinated  -She is not volume overloaded  -Continue dexamethasone, dose increased to 20 mg daily 3/5 due to increased need for  -Oxygen requirement increased to 50 L high flow nasal cannula 3/5  -Baricitinib twice daily for weeks started 3/2  -Procalcitonin level is only 0.1, will repeat procalcitonin level  -Appreciated pulmonary consult        Diabetes mellitus type 2  -Hemoglobin A1c is 11.9  -Continue NPH dose to 25 units BID  -We will c/w sliding scale insulin     Hypertension  Dyslipidemia  -Continue home lisinopril, metoprolol, Lipitor        Code Status: Full code  Surrogate Decision Maker:     DVT Prophylaxis: Lovenox  GI Prophylaxis: not indicated     Baseline: From home, independent of ADLs    Recommended Disposition: Home w/Family     Subjective:     Chief Complaint / Reason for Physician Visit  Patient's oxygen requirement increased to 50 L high flow 3/5, clinically does not report any worsening shortness of breath, cough or sputum production, no fever reported    Review of Systems:  Symptom Y/N Comments  Symptom Y/N Comments   Fever/Chills n   Chest Pain n    Poor Appetite n   Edema n    Cough n   Abdominal Pain n    Sputum n   Joint Pain     SOB/SALAS n   Pruritis/Rash     Nausea/vomit n   Tolerating PT/OT     Diarrhea n   Tolerating Diet     Constipation n   Other       Could NOT obtain due to:      Objective:     VITALS:   Last 24hrs VS reviewed since prior progress note.  Most recent are:  Patient Vitals for the past 24 hrs:   Temp Pulse Resp BP SpO2   22 0827     94 %   22 0711 97.1 °F (36.2 °C) 72 23 116/64 94 %   22 0519     100 %   03/06/22 0327 97.9 °F (36.6 °C) (!) 54 20 127/66 100 %   03/06/22 0200  (!) 55 17 120/64 100 %   03/06/22 0046     97 %   03/05/22 2332     92 %   03/05/22 1945     96 %   03/05/22 1936 98.4 °F (36.9 °C) 68 22 122/74 (!) 89 %   03/05/22 1511     93 %   03/05/22 1431 98.1 °F (36.7 °C) 64 20 96/65 94 %   03/05/22 1349   22  92 %   03/05/22 1331     94 %   03/05/22 1255 98.6 °F (37 °C) 65 20 110/60 96 %   03/05/22 1217  63      03/05/22 1204    (!) 104/58 (!) 83 %   03/05/22 1152 97.7 °F (36.5 °C) 68 20 (!) 95/54        Intake/Output Summary (Last 24 hours) at 3/6/2022 0857  Last data filed at 3/5/2022 1808  Gross per 24 hour   Intake 500 ml   Output    Net 500 ml        PHYSICAL EXAM:  General: WD, WN. Alert, cooperative, no acute distress    EENT:  EOMI. Anicteric sclerae. MMM  Resp:  CTA bilaterally, no wheezing or rales. No accessory muscle use  CV:  Regular  rhythm,  No edema  GI:  Soft, Non distended, Non tender.  +Bowel sounds  Neurologic:  Alert and oriented X 3, normal speech,   Psych:   Good insight. Not anxious nor agitated  Skin:  No rashes. No jaundice    Reviewed most current lab test results and cultures  YES  Reviewed most current radiology test results   YES  Review and summation of old records today    NO  Reviewed patient's current orders and MAR    YES  PMH/SH reviewed - no change compared to H&P  ________________________________________________________________________  Care Plan discussed with:    Comments   Patient x    Family      RN x    Care Manager x    Consultant                        Multidiciplinary team rounds were held today with , nursing, pharmacist and clinical coordinator. Patient's plan of care was discussed; medications were reviewed and discharge planning was addressed.      ________________________________________________________________________  Total NON critical care TIME:  30   Minutes    Total CRITICAL CARE TIME Spent: Minutes non procedure based      Comments   >50% of visit spent in counseling and coordination of care     ________________________________________________________________________  Minna Burton MD     Procedures: see electronic medical records for all procedures/Xrays and details which were not copied into this note but were reviewed prior to creation of Plan. LABS:  I reviewed today's most current labs and imaging studies.   Pertinent labs include:  Recent Labs     03/06/22 0316 03/05/22 0157 03/04/22  0248   WBC 6.2 9.4 7.6   HGB 12.3 12.3 12.4   HCT 35.7 35.9 36.3    265 283     Recent Labs     03/06/22 0316 03/05/22 0157 03/04/22  0248    138 139   K 4.4 4.0 3.6    105 106   CO2 27 28 27   * 121* 140*   BUN 32* 31* 36*   CREA 0.61 0.62 0.58   CA 8.3* 8.2* 8.5       Signed: Minna Burton MD

## 2022-03-06 NOTE — PROGRESS NOTES
0700- Report given to Luanne Martinez RN by off going nurse. 26- Pulmonology consult called. 1900- Report given to oncoming nurse by Luanne Martinez RN.

## 2022-03-06 NOTE — CONSULTS
Pulmonary    Pts chart, labs and radiology reviewed. 66 y/ woman with DM/HTN admitted with AHRF and CTA no PE + diffuse GGO. + COVID   On decadron and baricitinib , .    On HFNC now    > Severe COVID PNA and at risk for ICU .  > continue [facility] treatment protocol for COVID  > keep in negative fluid balance

## 2022-03-07 NOTE — PROGRESS NOTES
Transition of Care Plan:     RUR:14%  Disposition:Home with family. Follow up appointments:set up with  new PCP at [facility] for April 20th. She has paperwork that must be sent in by 4/ 14 or appointment will be cancelled. I have called her friend Viri and let her know this and she says she will take care of it and make sure paperwork is done. DME needed: She has no DME at home. Transportation at Discharge:her friend Serina Folds or means to access home:   Viri     IM Medicare Letter:She will need second IM letter. Is patient a BCPI-A Bundle:     N/A                 If yes, was Bundle Letter given?:    Is patient a [place] and connected with the [place]? N/A               If yes, was [place] transfer form completed and [facility] notified? Caregiver Contact:friend Viri Bishop . Viri has looked out for her the past 30 years. Also her niece Edwin Gaytan . Discharge Caregiver contacted prior to discharge? Viri  Care Conference needed?:  Not at this time      Patient was [facility] to pcu. She is currently on 30 liters high flow. Pulmonary consult has been ordered. Will continue to monitor. Sarah Madison RN BSN CRM        [phone]

## 2022-03-07 NOTE — PROGRESS NOTES
Hospitalist Progress Note    NAME: Gladys Zambrano   :  1950   MRN:  [id number]       Assessment / Plan:  Acute hypoxic respiratory failure due to  COVID-19 Pneumonia POA  -Coming with shortness of breath, cough and phlegm  -CT shows evidence of pulmonary edema versus infection  -Rapid COVID-19 test is positive  -Patient is unsure if she is vaccinated  -She is not volume overloaded  -Continue dexamethasone, dose increased to 20 mg daily 3/5 due to increased need for  -Oxygen requirement increased to 50 L high flow nasal cannula 3/5  -Baricitinib twice daily for weeks started 3/2  -Procalcitonin level is only 0.1, will repeat procalcitonin level  -Appreciated pulmonary consult  -Currently on 40 L high flow nasal cannula        Diabetes mellitus type 2  -Hemoglobin A1c is 11.9  -Continue NPH dose to 25 units BID  -We will c/w sliding scale insulin     Hypertension  Dyslipidemia  -Continue home lisinopril, metoprolol, Lipitor        Code Status: Full code  Surrogate Decision Maker:     DVT Prophylaxis: Lovenox  GI Prophylaxis: not indicated     Baseline: From home, independent of ADLs    Recommended Disposition: Home w/Family     Subjective:     Chief Complaint / Reason for Physician Visit  Patient's oxygen requirement increased to 50 L high flow 3/5, clinically does not report any worsening shortness of breath, cough or sputum production, no fever reported  Currently on 40 L high flow nasal cannula    Review of Systems:  Symptom Y/N Comments  Symptom Y/N Comments   Fever/Chills n   Chest Pain n    Poor Appetite n   Edema n    Cough n   Abdominal Pain n    Sputum n   Joint Pain     SOB/SALAS n   Pruritis/Rash     Nausea/vomit n   Tolerating PT/OT     Diarrhea n   Tolerating Diet     Constipation n   Other       Could NOT obtain due to:      Objective:     VITALS:   Last 24hrs VS reviewed since prior progress note.  Most recent are:  Patient Vitals for the past 24 hrs:   Temp Pulse Resp BP SpO2   22 1217     99 %   03/07/22 1200 97.7 °F (36.5 °C)       03/07/22 1116 97.9 °F (36.6 °C) 61 18 99/61 98 %   03/07/22 0456     92 %   03/07/22 0022     97 %   03/07/22 0000  (!) 56 24 138/70 96 %   03/06/22 1953 97.8 °F (36.6 °C) 63 22 131/70 97 %   03/06/22 1947     94 %   03/06/22 1539     100 %       Intake/Output Summary (Last 24 hours) at 3/7/2022 1503  Last data filed at 3/7/2022 0310  Gross per 24 hour   Intake 200 ml   Output 550 ml   Net -350 ml        PHYSICAL EXAM:  General: WD, WN. Alert, cooperative, no acute distress    EENT:  EOMI. Anicteric sclerae. MMM  Resp:  CTA bilaterally, no wheezing or rales. No accessory muscle use  CV:  Regular  rhythm,  No edema  GI:  Soft, Non distended, Non tender.  +Bowel sounds  Neurologic:  Alert and oriented X 3, normal speech,   Psych:   Good insight. Not anxious nor agitated  Skin:  No rashes. No jaundice    Reviewed most current lab test results and cultures  YES  Reviewed most current radiology test results   YES  Review and summation of old records today    NO  Reviewed patient's current orders and MAR    YES  PMH/ reviewed - no change compared to H&P  ________________________________________________________________________  Care Plan discussed with:    Comments   Patient x    Family      RN x    Care Manager x    Consultant                        Multidiciplinary team rounds were held today with , nursing, pharmacist and clinical coordinator. Patient's plan of care was discussed; medications were reviewed and discharge planning was addressed.      ________________________________________________________________________  Total NON critical care TIME:  30   Minutes    Total CRITICAL CARE TIME Spent:   Minutes non procedure based      Comments   >50% of visit spent in counseling and coordination of care     ________________________________________________________________________  Patsy Stringer MD     Procedures: see electronic medical records for all procedures/Xrays and details which were not copied into this note but were reviewed prior to creation of Plan. LABS:  I reviewed today's most current labs and imaging studies.   Pertinent labs include:  Recent Labs     03/07/22 0247 03/06/22 0316 03/05/22 0157   WBC 9.1 6.2 9.4   HGB 11.9 12.3 12.3   HCT 35.2 35.7 35.9    235 265     Recent Labs     03/07/22 0247 03/06/22 0316 03/05/22 0157    136 138   K 4.2 4.4 4.0    105 105   CO2 26 27 28   * 236* 121*   BUN 30* 32* 31*   CREA 0.54* 0.61 0.62   CA 8.7 8.3* 8.2*       Signed: Jennifer Gordon MD

## 2022-03-07 NOTE — PROGRESS NOTES
Pulmonary, Critical Care, and Sleep Medicine     Patient Consult    Name: Kaycee Berkowitz MRN: [id number]   : 1950 Hospital: [facility]   Date: 3/7/2022        IMPRESSION:   · Acute Hypoxic Respiratory Failure, on high flow oxygen. Adjust to keep pox over 88%. · Covid 19 Pneumonia,   · Type 2 DM  · Hypertension  · Full Code. RECOMMENDATIONS:   · Continue current level of support  · ON Decadron  · ON Olumiant  · ON Enoxaparin  · Glycemic control. · If worsened may need ICU evaluation. Subjective: This patient has been seen and evaluated at the request of Dr. Darby Fleischer for above. Patient is a 66 y.o. female had difficulty with sleeping. Has a dry cough. NO headache. NO sinus pain or pressure. NO GERD. Lying in bed. She was ready to eat this am.   Feels fatigued.        Past Medical History:   Diagnosis Date    Diabetes ([address]     Hypercholesterolemia     Hypertension       Past Surgical History:   Procedure Laterality Date    HX GYN      HX HYSTERECTOMY N/A       Prior to Admission medications    Not on File     No Known Allergies   Social History     Tobacco Use    Smoking status: Former Smoker     Packs/day: 0.50     Years: 4.00     Pack years: 2.00     Quit date: 1975     Years since quittin.7    Smokeless tobacco: Never Used   Substance Use Topics    Alcohol use: No      Family History   Problem Relation Age of Onset    Cancer Mother     Diabetes Mother     Heart Disease Mother     Diabetes Sister     Diabetes Sister     Diabetes Sister     Diabetes Sister     Cancer Sister         Current Facility-Administered Medications   Medication Dose Route Frequency    [START ON 3/10/2022] dexAMETHasone (DECADRON) tablet 10 mg  10 mg Oral DAILY    dexamethasone (DECADRON) 20 mg in 0.9% sodium chloride 50 mL IVPB  20 mg IntraVENous Q24H    insulin NPH (NOVOLIN N, HUMULIN N) injection 25 Units  25 Units SubCUTAneous ACB&D    baricitinib (OLUMIANT) tablet 4 mg  4 mg Oral DAILY    aspirin chewable tablet 81 mg  81 mg Oral DAILY    [Held by provider] lisinopriL (PRINIVIL, ZESTRIL) tablet 10 mg  10 mg Oral DAILY    [Held by provider] metoprolol succinate (TOPROL-XL) XL tablet 50 mg  50 mg Oral DAILY    atorvastatin (LIPITOR) tablet 10 mg  10 mg Oral DAILY    sodium chloride (NS) flush 5-40 mL  5-40 mL IntraVENous Q8H    enoxaparin (LOVENOX) injection 40 mg  40 mg SubCUTAneous DAILY    insulin lispro (HUMALOG) injection   SubCUTAneous AC&HS       Review of Systems:  Constitutional: positive for fatigue and malaise  Eyes: negative  Ears, nose, mouth, throat, and face: negative  Respiratory: positive for cough or dyspnea on exertion  Cardiovascular: negative  Gastrointestinal: negative  Genitourinary:negative  Integument/breast: negative  Hematologic/lymphatic: negative  Musculoskeletal:negative  Neurological: negative  Behavioral/Psych: negative  Endocrine: negative  Allergic/Immunologic: negative    Objective:   Vital Signs:    Visit Vitals  /70 (BP 1 Location: Right upper arm, BP Patient Position: At rest)   Pulse (!) 56   Temp 97.8 °F (36.6 °C)   Resp 24   Ht 5' (1.524 m)   Wt 61.2 kg (135 lb)   SpO2 92%   Breastfeeding No   BMI 26.37 kg/m²       O2 Device: Heated,Hi flow nasal cannula   O2 Flow Rate (L/min): 50 l/min   Temp (24hrs), Av.5 °F (36.4 °C), Min:97.3 °F (36.3 °C), Max:97.8 °F (36.6 °C)       Intake/Output:   Last shift:      No intake/output data recorded. Last 3 shifts:  1901 -  0700  In: 750 [P.O.:700; I.V.:50]  Out: 950 [Urine:950]    Intake/Output Summary (Last 24 hours) at 3/7/2022 1027  Last data filed at 3/7/2022 0310  Gross per 24 hour   Intake 450 ml   Output 950 ml   Net -500 ml      Physical Exam:   General:  Alert, cooperative, no distress, appears stated age. Head:  Normocephalic, without obvious abnormality, atraumatic. Eyes:  Conjunctivae/corneas clear. PERRL, EOMs intact. Nose: Nares normal. Septum midline. Mucosa normal. No drainage or sinus tenderness. Throat: Lips, mucosa, and tongue normal. Teeth and gums normal.   Neck: Supple, symmetrical, trachea midline, no adenopathy, thyroid: no enlargment/tenderness/nodules, no carotid bruit and no JVD. Back:   Symmetric, no curvature. ROM normal.   Lungs:   Clear to auscultation bilaterally. Chest wall:  No tenderness or deformity. Heart:  Regular rate and rhythm, S1, S2 normal, no murmur, click, rub or gallop. Abdomen:   Soft, non-tender. Bowel sounds normal. No masses,  No organomegaly. Extremities: Extremities normal, atraumatic, no cyanosis or edema. Pulses: 2+ and symmetric all extremities.    Skin: Skin color, texture, turgor normal. No rashes or lesions   Lymph nodes: Cervical, supraclavicular, and axillary nodes normal.   Neurologic: Grossly nonfocal     Data review:     Recent Results (from the past 24 hour(s))   GLUCOSE, POC    Collection Time: 03/06/22 10:48 AM   Result Value Ref Range    Glucose (POC) 211 (H) 65 - 117 mg/dL    [facility]    GLUCOSE, POC    Collection Time: 03/06/22  3:31 PM   Result Value Ref Range    Glucose (POC) 201 (H) 65 - 117 mg/dL    Performed by Peng Eli PCT    GLUCOSE, POC    Collection Time: 03/06/22  8:48 PM   Result Value Ref Range    Glucose (POC) 273 (H) 65 - 117 mg/dL    Performed by Ursula Jaramillo RN    CBC W/O DIFF    Collection Time: 03/07/22  2:47 AM   Result Value Ref Range    WBC 9.1 3.6 - 11.0 K/uL    RBC 4.21 3.80 - 5.20 M/uL    HGB 11.9 11.5 - 16.0 g/dL    HCT 35.2 35.0 - 47.0 %    MCV 83.6 80.0 - 99.0 FL    MCH 28.3 26.0 - 34.0 PG    MCHC 33.8 30.0 - 36.5 g/dL    RDW 12.1 11.5 - 14.5 %    PLATELET 104 470 - 231 K/uL    MPV 10.1 8.9 - 12.9 FL    NRBC 0.0 0  WBC    ABSOLUTE NRBC 0.00 0.00 - 3.36 K/uL   METABOLIC PANEL, BASIC    Collection Time: 03/07/22  2:47 AM   Result Value Ref Range    Sodium 136 136 - 145 mmol/L    Potassium 4.2 3.5 - 5.1 mmol/L    Chloride 104 97 - 108 mmol/L CO2 26 21 - 32 mmol/L    Anion gap 6 5 - 15 mmol/L    Glucose 128 (H) 65 - 100 mg/dL    BUN 30 (H) 6 - 20 MG/DL    Creatinine 0.54 (L) 0.55 - 1.02 MG/DL    BUN/Creatinine ratio 56 (H) 12 - 20      GFR est AA >60 >60 ml/min/1.73m2    GFR est non-AA >60 >60 ml/min/1.73m2    Calcium 8.7 8.5 - 10.1 MG/DL   GLUCOSE, POC    Collection Time: 03/07/22  7:36 AM   Result Value Ref Range    Glucose (POC) 87 65 - 117 mg/dL    Performed by Krista Elena PCT        Imaging:  I have personally reviewed the patients radiographs and have reviewed the reports:  3-1-22: CT of chest: IMPRESSION  1. No evidence for pulmonary embolism. 2. Bilateral groundglass opacification may be due to edema, pneumonia, or  hemorrhage and clinical correlation is recommended.   3. Cholelithiasis.           Judi Salas MD

## 2022-03-08 NOTE — ROUTINE PROCESS
Bedside and Verbal shift change report given to [address]  (oncoming nurse) by Gerda Rodriguez (offgoing nurse). Report included the following information SBAR, Kardex, OR Summary, MAR, Recent Results, Cardiac Rhythm NSR, Alarm Parameters  and Quality Measures.

## 2022-03-08 NOTE — PROGRESS NOTES
End of Shift Note    Bedside shift change report given to [address] (oncoming nurse) by Alana Dwyer RN (offgoing nurse). Report included the following information SBAR, Kardex, ED Summary, Intake/Output, MAR, Accordion, Med Rec Status, Cardiac Rhythm NSR, Alarm Parameters  and Quality Measures    Shift worked:  03/08     Shift summary and any significant changes:    Increased oxygen demands. DNR status. Concerns for physician to address: Not at this time   Zone phone for oncoming shift:        Activity:  Activity Level: Bed Rest  Number times ambulated in hallways past shift: 0  Number of times OOB to chair past shift: 1    Cardiac:   Cardiac Monitoring: Yes      Cardiac Rhythm: Sinus Rhythm    Access:   Current line(s): PIV     Genitourinary:   Urinary status: voiding and external catheter    Respiratory:   O2 Device: Hi flow nasal cannula  Chronic home O2 use?: NO  Incentive spirometer at bedside: NO       GI:  Last Bowel Movement Date: 03/06/22  Current diet:  ADULT DIET Regular; 4 carb choices (60 gm/meal)  DIET ONE TIME MESSAGE  Passing flatus: YES  Tolerating current diet: YES       Pain Management:   Patient states pain is manageable on current regimen: N/A    Skin:  Mike Score: 16  Interventions: turn team, float heels, increase time out of bed and internal/external urinary devices    Patient Safety:  Fall Score:  Total Score: 4  Interventions: bed/chair alarm, gripper socks and pt to call before getting OOB  High Fall Risk: Yes    Length of Stay:  Expected LOS: 5d 9h  Actual LOS: [id number] Henrique Sage RN

## 2022-03-08 NOTE — PROGRESS NOTES
Hospitalist Progress Note    NAME: Kamila Hernandez   :  1950   MRN:  [id number]       Assessment / Plan:  Acute hypoxic respiratory failure due to  COVID-19 Pneumonia POA  Hypertension  -Coming with shortness of breath, cough and phlegm  -CT shows evidence of pulmonary edema versus infection  -Rapid COVID-19 test is positive  -Patient is unsure if she is vaccinated  -She is not volume overloaded  -Continue dexamethasone, dose increased to 20 mg daily 3/5 due to increased need for  -Oxygen requirement increased to 50 L high flow nasal cannula 3/5  -Baricitinib twice daily for weeks started 3/2  -Procalcitonin level is only 0.1, will repeat procalcitonin level  -Appreciated pulmonary consult  -Patient is slightly hypotensive, will give 250 L of saline bolus  -We will avoid overhydration        Diabetes mellitus type 2  -Hemoglobin A1c is 11.9  -Continue NPH dose to 25 units BID  -We will c/w sliding scale insulin     Hypertension  Dyslipidemia  -Continue home lisinopril, metoprolol, Lipitor        Code Status: To be DNR now  Surrogate Decision Maker:     DVT Prophylaxis: Lovenox  GI Prophylaxis: not indicated     Baseline: From home, independent of ADLs    Recommended Disposition: Home w/Family     Subjective:     Chief Complaint / Reason for Physician Visit  Remains on 50 L oxygen, was noted to be hypotensive. Denies any active complaints  Review of Systems:  Symptom Y/N Comments  Symptom Y/N Comments   Fever/Chills n   Chest Pain n    Poor Appetite n   Edema n    Cough n   Abdominal Pain n    Sputum n   Joint Pain     SOB/SALAS n   Pruritis/Rash     Nausea/vomit n   Tolerating PT/OT     Diarrhea n   Tolerating Diet     Constipation n   Other       Could NOT obtain due to:      Objective:     VITALS:   Last 24hrs VS reviewed since prior progress note.  Most recent are:  Patient Vitals for the past 24 hrs:   Temp Pulse Resp BP SpO2   22 1130 97.7 °F (36.5 °C) 69 (!) 31 (!) 82/54 (!) 87 %   22 1105     100 %   03/08/22 0742     90 %   03/08/22 0734 97.7 °F (36.5 °C) 73 29 (!) 102/55 (!) 81 %   03/08/22 0343     96 %   03/08/22 0330 97.9 °F (36.6 °C) 70 25 (!) 152/78 91 %   03/07/22 2320 97.9 °F (36.6 °C) (!) 55 17 (!) 146/66 92 %   03/07/22 2255     98 %   03/07/22 2022     97 %   03/07/22 2000 97.9 °F (36.6 °C) 70 29 128/74 95 %   03/07/22 1800  60 25 (!) 167/82 100 %   03/07/22 1705     91 %   03/07/22 1600 97.9 °F (36.6 °C) 61 23 (!) 151/75 100 %       Intake/Output Summary (Last 24 hours) at 3/8/2022 1446  Last data filed at 3/8/2022 0402  Gross per 24 hour   Intake 120 ml   Output 400 ml   Net -280 ml        PHYSICAL EXAM:  General: WD, WN. Alert, cooperative, no acute distress    EENT:  EOMI. Anicteric sclerae. MMM  Resp:  CTA bilaterally, no wheezing or rales. No accessory muscle use  CV:  Regular  rhythm,  No edema  GI:  Soft, Non distended, Non tender.  +Bowel sounds  Neurologic:  Alert and oriented X 3, normal speech,   Psych:   Good insight. Not anxious nor agitated  Skin:  No rashes. No jaundice    Reviewed most current lab test results and cultures  YES  Reviewed most current radiology test results   YES  Review and summation of old records today    NO  Reviewed patient's current orders and MAR    YES  PMH/SH reviewed - no change compared to H&P  ________________________________________________________________________  Care Plan discussed with:    Comments   Patient x    Family      RN x    Care Manager     Consultant                        Multidiciplinary team rounds were held today with , nursing, pharmacist and clinical coordinator. Patient's plan of care was discussed; medications were reviewed and discharge planning was addressed.      ________________________________________________________________________  Total NON critical care TIME:  30   Minutes    Total CRITICAL CARE TIME Spent:   Minutes non procedure based      Comments   >50% of visit spent in counseling and coordination of care     ________________________________________________________________________  Ajay Ovalles MD     Procedures: see electronic medical records for all procedures/Xrays and details which were not copied into this note but were reviewed prior to creation of Plan. LABS:  I reviewed today's most current labs and imaging studies.   Pertinent labs include:  Recent Labs     03/08/22  0230 03/07/22  0247 03/06/22  0316   WBC 8.7 9.1 6.2   HGB 12.7 11.9 12.3   HCT 37.1 35.2 35.7    292 235     Recent Labs     03/08/22  0230 03/07/22  0247 03/06/22  0316   * 136 136   K 4.6 4.2 4.4    104 105   CO2 27 26 27   * 128* 236*   BUN 31* 30* 32*   CREA 0.55 0.54* 0.61   CA 8.4* 8.7 8.3*       Signed: Ajay Ovalles MD

## 2022-03-08 NOTE — PROGRESS NOTES
Pulmonary, Critical Care, and Sleep Medicine     Patient Consult    Name: Claudell Deck MRN: [id number]   : 1950 Hospital: [facility]   Date: 3/8/2022        IMPRESSION:   · Acute Hypoxic Respiratory Failure, on high flow oxygen. Adjust to keep pox over 88%. · Covid 19 Pneumonia, Severe multilobar infiltrates. · Type 2 DM  · Hypertension  · Full Code. RECOMMENDATIONS:   · Continue current level of support, Wean oxygen to keep pox over 88%. · ON Decadron  · ON Olumiant  · ON Enoxaparin  · Glycemic control. · Tolerating regular diet. · If worsens  Will need ICU evaluation. Subjective:   3-8-22: PT  Is alert and interactive this am. She is on high flow NC oxygen. NO chest pain, she has mild lower back pain, no leg pain. She feels that her breathing is comfortable. NO sinus pain or pressure. Has been tolerating the High flow NC. Was able to get some sleep at night. This patient has been seen and evaluated at the request of Dr. Eitan Osei for above. Patient is a 66 y.o. female had difficulty with sleeping. Has a dry cough. NO headache. NO sinus pain or pressure. NO GERD. Lying in bed. She was ready to eat this am.   Feels fatigued.        Past Medical History:   Diagnosis Date    Diabetes ([address]     Hypercholesterolemia     Hypertension       Past Surgical History:   Procedure Laterality Date    HX GYN      HX HYSTERECTOMY N/A       Prior to Admission medications    Not on File     No Known Allergies   Social History     Tobacco Use    Smoking status: Former Smoker     Packs/day: 0.50     Years: 4.00     Pack years: 2.00     Quit date: 1975     Years since quittin.7    Smokeless tobacco: Never Used   Substance Use Topics    Alcohol use: No      Family History   Problem Relation Age of Onset    Cancer Mother     Diabetes Mother     Heart Disease Mother     Diabetes Sister     Diabetes Sister     Diabetes Sister     Diabetes Sister    Thu  Cancer Sister         Current Facility-Administered Medications   Medication Dose Route Frequency    [START ON 3/10/2022] dexAMETHasone (DECADRON) tablet 10 mg  10 mg Oral DAILY    dexamethasone (DECADRON) 20 mg in 0.9% sodium chloride 50 mL IVPB  20 mg IntraVENous Q24H    insulin NPH (NOVOLIN N, HUMULIN N) injection 25 Units  25 Units SubCUTAneous ACB&D    baricitinib (OLUMIANT) tablet 4 mg  4 mg Oral DAILY    aspirin chewable tablet 81 mg  81 mg Oral DAILY    [Held by provider] lisinopriL (PRINIVIL, ZESTRIL) tablet 10 mg  10 mg Oral DAILY    [Held by provider] metoprolol succinate (TOPROL-XL) XL tablet 50 mg  50 mg Oral DAILY    atorvastatin (LIPITOR) tablet 10 mg  10 mg Oral DAILY    sodium chloride (NS) flush 5-40 mL  5-40 mL IntraVENous Q8H    enoxaparin (LOVENOX) injection 40 mg  40 mg SubCUTAneous DAILY    insulin lispro (HUMALOG) injection   SubCUTAneous AC&HS       Review of Systems:  Constitutional: positive for fatigue and malaise  Eyes: negative  Ears, nose, mouth, throat, and face: negative  Respiratory: positive for cough or dyspnea on exertion  Cardiovascular: negative  Gastrointestinal: negative  Genitourinary:negative  Integument/breast: negative  Hematologic/lymphatic: negative  Musculoskeletal:negative  Neurological: negative  Behavioral/Psych: negative  Endocrine: negative  Allergic/Immunologic: negative    Objective:   Vital Signs:    Visit Vitals  BP (!) 102/55 (BP 1 Location: Right upper arm, BP Patient Position: Semi fowlers)   Pulse 73   Temp 97.7 °F (36.5 °C)   Resp 29   Ht 5' (1.524 m)   Wt 61.2 kg (135 lb)   SpO2 90%   Breastfeeding No   BMI 26.37 kg/m²       O2 Device: Hi flow nasal cannula   O2 Flow Rate (L/min): 50 l/min   Temp (24hrs), Av.8 °F (36.6 °C), Min:97.7 °F (36.5 °C), Max:97.9 °F (36.6 °C)       Intake/Output:   Last shift:      No intake/output data recorded.   Last 3 shifts:  1901 -  0700  In: 120 [P.O.:120]  Out: 700 [Urine:700]    Intake/Output Summary (Last 24 hours) at 3/8/2022 1038  Last data filed at 3/8/2022 0402  Gross per 24 hour   Intake 120 ml   Output 400 ml   Net -280 ml      Physical Exam:   General:  Alert, cooperative, no distress, appears stated age. Head:  Normocephalic, without obvious abnormality, atraumatic. Eyes:  Conjunctivae/corneas clear. PERRL, EOMs intact. Nose: Nares normal. Septum midline. Mucosa normal. No drainage or sinus tenderness. Throat: Lips, mucosa, and tongue normal. Teeth and gums normal.   Neck: Supple, symmetrical, trachea midline, no adenopathy, thyroid: no enlargment/tenderness/nodules, no carotid bruit and no JVD. Back:   Symmetric, no curvature. ROM normal.   Lungs:   Clear to auscultation bilaterally. Chest wall:  No tenderness or deformity. Heart:  Regular rate and rhythm, S1, S2 normal, no murmur, click, rub or gallop. Abdomen:   Soft, non-tender. Bowel sounds normal. No masses,  No organomegaly. Extremities: Extremities normal, atraumatic, no cyanosis or edema. Pulses: 2+ and symmetric all extremities. Skin: Skin color, texture, turgor normal. No rashes or lesions   Lymph nodes: Cervical, supraclavicular, and axillary nodes normal.   Neurologic: Grossly nonfocal, motor is intact.       Data review:     Recent Results (from the past 24 hour(s))   GLUCOSE, POC    Collection Time: 03/07/22 11:18 AM   Result Value Ref Range    Glucose (POC) 191 (H) 65 - 117 mg/dL    Performed by Kristina Lee PCT    GLUCOSE, POC    Collection Time: 03/07/22  5:18 PM   Result Value Ref Range    Glucose (POC) 199 (H) 65 - 117 mg/dL    Performed by [address] [place] [address], POC    Collection Time: 03/07/22  9:38 PM   Result Value Ref Range    Glucose (POC) 262 (H) 65 - 117 mg/dL    Performed by Homar Burton (RULA RN)    CBC W/O DIFF    Collection Time: 03/08/22  2:30 AM   Result Value Ref Range    WBC 8.7 3.6 - 11.0 K/uL    RBC 4.35 3.80 - 5.20 M/uL    HGB 12.7 11.5 - 16.0 g/dL    HCT 37.1 35.0 - 47.0 % MCV 85.3 80.0 - 99.0 FL    MCH 29.2 26.0 - 34.0 PG    MCHC 34.2 30.0 - 36.5 g/dL    RDW 11.9 11.5 - 14.5 %    PLATELET 145 327 - 814 K/uL    MPV 10.0 8.9 - 12.9 FL    NRBC 0.0 0  WBC    ABSOLUTE NRBC 0.00 0.00 - 8.99 K/uL   METABOLIC PANEL, BASIC    Collection Time: 03/08/22  2:30 AM   Result Value Ref Range    Sodium 135 (L) 136 - 145 mmol/L    Potassium 4.6 3.5 - 5.1 mmol/L    Chloride 105 97 - 108 mmol/L    CO2 27 21 - 32 mmol/L    Anion gap 3 (L) 5 - 15 mmol/L    Glucose 102 (H) 65 - 100 mg/dL    BUN 31 (H) 6 - 20 MG/DL    Creatinine 0.55 0.55 - 1.02 MG/DL    BUN/Creatinine ratio 56 (H) 12 - 20      GFR est AA >60 >60 ml/min/1.73m2    GFR est non-AA >60 >60 ml/min/1.73m2    Calcium 8.4 (L) 8.5 - 10.1 MG/DL   GLUCOSE, POC    Collection Time: 03/08/22  7:26 AM   Result Value Ref Range    Glucose (POC) 74 65 - 117 mg/dL    Performed by [facility]        Imaging:  I have personally reviewed the patients radiographs and have reviewed the reports:  3-1-22: CT of chest: IMPRESSION  1. No evidence for pulmonary embolism. 2. Bilateral groundglass opacification may be due to edema, pneumonia, or  hemorrhage and clinical correlation is recommended.   3. Cholelithiasis.           Blessing Mohr MD

## 2022-03-09 NOTE — PROGRESS NOTES
PHysical Therapy    Received PT order, chart reviewed. Noting worsening rest status with increased O2 needs up to 55L HHF at 90%. Now at [address] HHF at 90%. (previously on 15-25L) Spoke with RN and all agree pt not able to tolerate therapy session at this time. Will monitor and follow for eval when pt stable enough to tolerate.     Paras Castañeda PT

## 2022-03-09 NOTE — PROGRESS NOTES
Comprehensive Nutrition Assessment    Type and Reason for Visit: Initial,RD nutrition re-screen/LOS    Nutrition Recommendations/Plan:   · Continue Easy To Chew diet with addition of chopped meats only. · RD ordered Nepro shake po BID with meals to assist with meeting increased nutritional demands per COVID. · Please document % meals and supplements consumed in flowsheet I/O's under intake. Nutrition Assessment:     3/9: Chart reviewed; med noted for acute resp failure secondary to COVID. Pt continues to require high amounts of O2. RD visited with pt at bedside, reports appetite is good but has difficulty chewing meats. Diet was downgraded to an Easy To Chew but we also discussed adding a note to chop all meats which pt was agreeable. Discussed adding a Nepro protein shake BID for supplemental nutrition. Per weight documented in the flowsheet, appears to have experienced weight loss but pt unable to confirm. Documented PO intakes >50% of meals. Pt had trouble getting comfortable during visit so adjusted pillows for pt which seemed to help. Attempted to complete a nutrition focused physical exam on pt but difficult to determine wt trends and muscle mass prior to admission. Pt unable to tell me if she has noticed weight loss, clothes fitting different, etc.    Patient Vitals for the past 168 hrs:   % Diet Eaten   03/09/22 1223 51 - 75%   03/09/22 1025 76 - 100%   03/08/22 1700 76 - 100%   03/08/22 1300 76 - 100%   03/08/22 0700 76 - 100%   03/06/22 1812 26 - 50%   03/06/22 1230 76 - 100%   03/06/22 0930 51 - 75%   03/05/22 1808 51 - 75%     Last Weight Metric  Weight Loss Metrics 3/7/2022 2/23/2022 7/14/2020 7/14/2020 6/9/2020 7/29/2014 7/15/2014   Today's Wt 135 lb 148 lb 15.8 oz 152 lb 150 lb 152 lb 4.8 oz 173 lb 12.8 oz 175 lb   BMI 26.37 kg/m2 29.1 kg/m2 29.69 kg/m2 29.29 kg/m2 29.25 kg/m2 33.9 kg/m2 34.13 kg/m2     Estimated Daily Nutrient Needs:  Energy (kcal): 1350 (BMR 1039 x 1. 3AF);  Weight Used for Energy Requirements: Current  Protein (g): 61-73 (1.0-1.2 g/kg bw); Weight Used for Protein Requirements: Current  Fluid (ml/day): 1400 ml/day; Method Used for Fluid Requirements: 1 ml/kcal    Nutrition Related Findings:  BM: 3/7; Labs: BG varies POC ; Meds: reviewed      Wounds:    None       Current Nutrition Therapies:  DIET ONE TIME MESSAGE  ADULT ORAL NUTRITION SUPPLEMENT Breakfast, Dinner; Renal Supplement  ADULT DIET Easy to Chew; 4 carb choices (60 gm/meal); Chop all meats to make it easier to chew;any flavor Nepro shake    Anthropometric Measures:  · Height:  5' (152.4 cm)  · Current Body Wt:  61.2 kg (134 lb 14.7 oz)   · Ideal Body Wt:  100 lbs:  134.9 %   · BMI Category: Overweight (BMI 25.0-29. 9)       Nutrition Diagnosis:   · Increased nutrient needs related to increased demand for energy/nutrients as evidenced by  (increased energy/protein per increased respiratory demands with COVID)    Nutrition Interventions:   Food and/or Nutrient Delivery: Modify current diet,Start oral nutrition supplement  Nutrition Education and Counseling: No recommendations at this time  Coordination of Nutrition Care: Continue to monitor while inpatient    Goals:  Trend PO intake at least 50% of meals + consume 240 ml Nepro marky next 5-7 days       Nutrition Monitoring and Evaluation:   Behavioral-Environmental Outcomes: None identified  Food/Nutrient Intake Outcomes: Food and nutrient intake,Supplement intake  Physical Signs/Symptoms Outcomes: Weight,Skin    Discharge Planning:     Too soon to determine     Electronically signed by Odalys Alvarenga RD on 3/9/2022 at 1:31 PM    Contact:

## 2022-03-09 NOTE — PROGRESS NOTES
Attempted to see pt for OT services. Noted worsening respiratory status and needed for 55L HF NC at 90% from 15-25L. Spoke with nursing and all are in agreement to defer OT at this time. Will continue to follow.

## 2022-03-09 NOTE — PROGRESS NOTES
~2310: Verbal report/handoff received from [place] [address] [facility]. Report received on information from the following report(s) SBAR, Kardex, MAR, Intake/Output, Recent Results, and Cardiac Rhythm NSR/SB was reviewed with receiving nurse. For additional details see Flowsheets and MAR. Highlights are noted below:      ~2330: Shift assessment completed. Patient resting in bed and no acute distress noted. See Flowsheets and MAR for details.     ~0246: NP Purveyor notified and aware of critical lab, see Flowsheets. No new orders. Will continue to monitor. ~0230: Reassessment completed. Patient resting in bed and no acute distress noted. See Flowsheets and MAR for details. ~0500: Reassessment completed. Patient resting in bed and no acute distress noted. See Flowsheets and MAR for details. Verbal report/handoff given to GEORGES Carr. Report given on information from the following report(s) SBAR, Kardex, MAR, Intake/Output, Recent Results, and Cardiac Rhythm NSR/SB was reviewed with receiving nurse. For additional details see Flowsheets and MAR.

## 2022-03-09 NOTE — PROGRESS NOTES
Pulmonary, Critical Care, and Sleep Medicine     Patient Consult    Name: Noel Cano MRN: [id number]   : 1950 Hospital: [facility]   Date: 3/9/2022        IMPRESSION:   · Acute Hypoxic Respiratory Failure, on high flow oxygen. Adjust to keep pox over 88%. Seems to be tolerating well. · Covid 19 Pneumonia, Severe multilobar infiltrates. Still on high level of oxygen. · Type 2 DM  · Hypertension  · Full Code. RECOMMENDATIONS:   · Continue current level of support, Wean oxygen to keep pox over 88%. · ON Decadron  · ON Olumiant  · ON Enoxaparin  · Glycemic control. · Tolerating regular diet. · Will ask PT and OT to assess and Rx. Subjective:   3-9-22: PT  Is alert and interactive this am. She is on high flow NC oxygen. NO chest pain, she has mild lower back pain, no leg pain, difficult to move in her bed. She feels that her breathing is comfortable. NO sinus pain or pressure. Has been tolerating the High flow NC. Tolerated eating her breakfast well this am.         This patient has been seen and evaluated at the request of Dr. Aldo Arrington for above. Patient is a 66 y.o. female had difficulty with sleeping. Has a dry cough. NO headache. NO sinus pain or pressure. NO GERD. Lying in bed. She was ready to eat this am.   Feels fatigued.        Past Medical History:   Diagnosis Date    Diabetes ([facility] [address]     Hypercholesterolemia     Hypertension       Past Surgical History:   Procedure Laterality Date    HX GYN      HX HYSTERECTOMY N/A       Prior to Admission medications    Not on File     No Known Allergies   Social History     Tobacco Use    Smoking status: Former Smoker     Packs/day: 0.50     Years: 4.00     Pack years: 2.00     Quit date: 1975     Years since quittin.7    Smokeless tobacco: Never Used   Substance Use Topics    Alcohol use: No      Family History   Problem Relation Age of Onset    Cancer Mother     Diabetes Mother     Heart Disease Mother     Diabetes Sister     Diabetes Sister     Diabetes Sister     Diabetes Sister     Cancer Sister         Current Facility-Administered Medications   Medication Dose Route Frequency    insulin NPH (NOVOLIN N, HUMULIN N) injection 22 Units  22 Units SubCUTAneous ACB&D    [START ON 3/10/2022] dexAMETHasone (DECADRON) tablet 10 mg  10 mg Oral DAILY    dexamethasone (DECADRON) 20 mg in 0.9% sodium chloride 50 mL IVPB  20 mg IntraVENous Q24H    baricitinib (OLUMIANT) tablet 4 mg  4 mg Oral DAILY    aspirin chewable tablet 81 mg  81 mg Oral DAILY    [Held by provider] lisinopriL (PRINIVIL, ZESTRIL) tablet 10 mg  10 mg Oral DAILY    [Held by provider] metoprolol succinate (TOPROL-XL) XL tablet 50 mg  50 mg Oral DAILY    atorvastatin (LIPITOR) tablet 10 mg  10 mg Oral DAILY    sodium chloride (NS) flush 5-40 mL  5-40 mL IntraVENous Q8H    enoxaparin (LOVENOX) injection 40 mg  40 mg SubCUTAneous DAILY    insulin lispro (HUMALOG) injection   SubCUTAneous AC&HS       Review of Systems:  Constitutional: positive for fatigue and malaise  Eyes: negative  Ears, nose, mouth, throat, and face: negative  Respiratory: positive for cough or dyspnea on exertion  Cardiovascular: negative  Gastrointestinal: negative  Genitourinary:negative  Integument/breast: negative  Hematologic/lymphatic: negative  Musculoskeletal:negative  Neurological: negative  Behavioral/Psych: negative  Endocrine: negative  Allergic/Immunologic: negative    Objective:   Vital Signs:    Visit Vitals  /73 (BP 1 Location: Left upper arm, BP Patient Position: At rest)   Pulse 60   Temp 97.2 °F (36.2 °C)   Resp 21   Ht 5' (1.524 m)   Wt 61.2 kg (135 lb)   SpO2 97%   Breastfeeding No   BMI 26.37 kg/m²       O2 Device: Heated,Hi flow nasal cannula   O2 Flow Rate (L/min): 55 l/min   Temp (24hrs), Av.3 °F (36.3 °C), Min:96 °F (35.6 °C), Max:97.9 °F (36.6 °C)       Intake/Output:   Last shift:      No intake/output data recorded.   Last 3 shifts: 03/07 1901 - 03/09 0700  In: 120 [P.O.:120]  Out: 1400 [Urine:1400]    Intake/Output Summary (Last 24 hours) at 3/9/2022 1055  Last data filed at 3/9/2022 0644  Gross per 24 hour   Intake    Output 1000 ml   Net -1000 ml      Physical Exam:   General:  Alert, cooperative, no distress, appears stated age. Head:  Normocephalic, without obvious abnormality, atraumatic. Eyes:  Conjunctivae/corneas clear. PERRL, EOMs intact. Nose: Nares normal. Septum midline. Mucosa normal. No drainage or sinus tenderness. Throat: Lips, mucosa, and tongue normal. Teeth and gums normal.   Neck: Supple, symmetrical, trachea midline, no adenopathy, thyroid: no enlargment/tenderness/nodules, no carotid bruit and no JVD. Back:   Symmetric, no curvature. ROM normal.   Lungs:   Clear to auscultation bilaterally. Chest wall:  No tenderness or deformity. Heart:  Regular rate and rhythm, S1, S2 normal, no murmur, click, rub or gallop. Abdomen:   Soft, non-tender. Bowel sounds normal. No masses,  No organomegaly. Extremities: Extremities normal, atraumatic, no cyanosis or edema. Pulses: 2+ and symmetric all extremities. Skin: Skin color, texture, turgor normal. No rashes or lesions   Lymph nodes: Cervical, supraclavicular, and axillary nodes normal.   Neurologic: Grossly nonfocal, motor is intact.       Data review:     Recent Results (from the past 24 hour(s))   GLUCOSE, POC    Collection Time: 03/08/22 11:25 AM   Result Value Ref Range    Glucose (POC) 157 (H) 65 - 117 mg/dL    Performed by Terrence Wagoner PCT    GLUCOSE, POC    Collection Time: 03/08/22  4:07 PM   Result Value Ref Range    Glucose (POC) 203 (H) 65 - 117 mg/dL    Performed by Sarika Alford PCT    GLUCOSE, POC    Collection Time: 03/08/22  9:21 PM   Result Value Ref Range    Glucose (POC) 281 (H) 65 - 117 mg/dL    Performed by Hyacinth Mckeon (RULA RN)    CBC W/O DIFF    Collection Time: 03/09/22  2:02 AM   Result Value Ref Range    WBC 12.1 (H) 3.6 - 11.0 K/uL    RBC 4.52 3.80 - 5.20 M/uL    HGB 13.1 11.5 - 16.0 g/dL    HCT 37.5 35.0 - 47.0 %    MCV 83.0 80.0 - 99.0 FL    MCH 29.0 26.0 - 34.0 PG    MCHC 34.9 30.0 - 36.5 g/dL    RDW 12.0 11.5 - 14.5 %    PLATELET 188 (H) 574 - 400 K/uL    MPV 10.1 8.9 - 12.9 FL    NRBC 0.0 0  WBC    ABSOLUTE NRBC 0.00 0.00 - 4.51 K/uL   METABOLIC PANEL, BASIC    Collection Time: 03/09/22  2:02 AM   Result Value Ref Range    Sodium 136 136 - 145 mmol/L    Potassium 4.4 3.5 - 5.1 mmol/L    Chloride 105 97 - 108 mmol/L    CO2 27 21 - 32 mmol/L    Anion gap 4 (L) 5 - 15 mmol/L    Glucose 53 (LL) 65 - 100 mg/dL    BUN 30 (H) 6 - 20 MG/DL    Creatinine 0.53 (L) 0.55 - 1.02 MG/DL    BUN/Creatinine ratio 57 (H) 12 - 20      GFR est AA >60 >60 ml/min/1.73m2    GFR est non-AA >60 >60 ml/min/1.73m2    Calcium 8.5 8.5 - 10.1 MG/DL   GLUCOSE, POC    Collection Time: 03/09/22  3:14 AM   Result Value Ref Range    Glucose (POC) 112 65 - 117 mg/dL    Performed by Lissett Ruiz (TRV RN)    GLUCOSE, POC    Collection Time: 03/09/22  7:28 AM   Result Value Ref Range    Glucose (POC) 76 65 - 117 mg/dL    Performed by Coby Ruiz PCT        Imaging:  I have personally reviewed the patients radiographs and have reviewed the reports:  3-1-22: CT of chest: IMPRESSION  1. No evidence for pulmonary embolism. 2. Bilateral groundglass opacification may be due to edema, pneumonia, or  hemorrhage and clinical correlation is recommended.   3. Cholelithiasis.           Hima Walls MD

## 2022-03-09 NOTE — PROGRESS NOTES
Hospitalist Progress Note    NAME: Emanuel Preciado   :  1950   MRN:  [id number]       Assessment / Plan:  Acute hypoxic respiratory failure due to  COVID-19 Pneumonia POA  Hypertension  -Coming with shortness of breath, cough and phlegm  -CT shows evidence of pulmonary edema versus infection  -Rapid COVID-19 test is positive  -Patient is unsure if she is vaccinated  -She is not volume overloaded  -Continue dexamethasone, dose increased to 20 mg daily 3/5 due to increased need for  -Oxygen requirement increased to 50 L high flow nasal cannula 3/5  -After 5 days of 20 mg of dexamethasone, dose now 10 mg daily  -Baricitinib twice daily for weeks started 3/2  -Procalcitonin level is only 0.1, will repeat procalcitonin level  -Appreciated pulmonary consult  -Hypotension is resolved  -Continue to hold antihypertensive medications      Diabetes mellitus type 2  -Hemoglobin A1c is 11.9  -Continue NPH, decrease the dose to 20 units twice daily as having hypoglycemic episodes  -We will c/w sliding scale insulin     Hypertension  Dyslipidemia  -Continue home lisinopril, metoprolol, Lipitor        Code Status: To be DNR now  Surrogate Decision Maker:     DVT Prophylaxis: Lovenox  GI Prophylaxis: not indicated     Baseline: From home, independent of ADLs    Recommended Disposition: Home w/Family     Subjective:     Chief Complaint / Reason for Physician Visit  Remains on 50 L oxygen, was noted to be hypotensive. Denies any active complaints  Review of Systems:  Symptom Y/N Comments  Symptom Y/N Comments   Fever/Chills n   Chest Pain n    Poor Appetite n   Edema n    Cough n   Abdominal Pain n    Sputum n   Joint Pain     SOB/SALAS n   Pruritis/Rash     Nausea/vomit n   Tolerating PT/OT     Diarrhea n   Tolerating Diet     Constipation n   Other       Could NOT obtain due to:      Objective:     VITALS:   Last 24hrs VS reviewed since prior progress note.  Most recent are:  Patient Vitals for the past 24 hrs:   Temp Pulse Resp BP SpO2   03/09/22 1107 97.7 °F (36.5 °C) 60 22 105/76 100 %   03/09/22 1106     98 %   03/09/22 0800 97.2 °F (36.2 °C) 60 21 138/73 97 %   03/09/22 0755     98 %   03/09/22 0700  (!) 57 17     03/09/22 0600  (!) 51 14 (!) 148/94 98 %   03/09/22 0500 96.8 °F (36 °C) (!) 52 16  94 %   03/09/22 0400  (!) 59 14 (!) 155/72 95 %   03/09/22 0322     98 %   03/09/22 0300  66 18     03/09/22 0200  67 25 110/64 95 %   03/09/22 0100  (!) 55 17  100 %   03/09/22 0002     99 %   03/09/22 0000 (!) 96 °F (35.6 °C) (!) 59 20 118/73 98 %   03/08/22 2300  (!) 55 18  99 %   03/08/22 2200  (!) 53 16 (!) 141/92 98 %   03/08/22 2000 97.7 °F (36.5 °C) (!) 58 15 (!) 163/80 99 %   03/08/22 1946     94 %   03/08/22 1800  (!) 59 21 134/68 100 %   03/08/22 1700  72 20  92 %   03/08/22 1600  64 18 135/78 98 %   03/08/22 1544     96 %   03/08/22 1500 97.9 °F (36.6 °C) 63 22 120/67 97 %       Intake/Output Summary (Last 24 hours) at 3/9/2022 1421  Last data filed at 3/9/2022 0644  Gross per 24 hour   Intake    Output 1000 ml   Net -1000 ml        PHYSICAL EXAM:  General: WD, WN. Alert, cooperative, no acute distress    EENT:  EOMI. Anicteric sclerae. MMM  Resp:  CTA bilaterally, no wheezing or rales. No accessory muscle use  CV:  Regular  rhythm,  No edema  GI:  Soft, Non distended, Non tender.  +Bowel sounds  Neurologic:  Alert and oriented X 3, normal speech,   Psych:   Good insight. Not anxious nor agitated  Skin:  No rashes.   No jaundice    Reviewed most current lab test results and cultures  YES  Reviewed most current radiology test results   YES  Review and summation of old records today    NO  Reviewed patient's current orders and MAR    YES  PMH/SH reviewed - no change compared to H&P  ________________________________________________________________________  Care Plan discussed with:    Comments   Patient x    Family      RN x    Care Manager     Consultant                       x Multidiciplinary team rounds were held today with , nursing, pharmacist and clinical coordinator. Patient's plan of care was discussed; medications were reviewed and discharge planning was addressed. ________________________________________________________________________  Total NON critical care TIME:  30   Minutes    Total CRITICAL CARE TIME Spent:   Minutes non procedure based      Comments   >50% of visit spent in counseling and coordination of care     ________________________________________________________________________  Sachin Irene MD     Procedures: see electronic medical records for all procedures/Xrays and details which were not copied into this note but were reviewed prior to creation of Plan. LABS:  I reviewed today's most current labs and imaging studies.   Pertinent labs include:  Recent Labs     03/09/22  0202 03/08/22  0230 03/07/22  0247   WBC 12.1* 8.7 9.1   HGB 13.1 12.7 11.9   HCT 37.5 37.1 35.2   * 350 292     Recent Labs     03/09/22  0202 03/08/22  0230 03/07/22  0247    135* 136   K 4.4 4.6 4.2    105 104   CO2 27 27 26   GLU 53* 102* 128*   BUN 30* 31* 30*   CREA 0.53* 0.55 0.54*   CA 8.5 8.4* 8.7       Signed: Sachin Irene MD

## 2022-03-09 NOTE — PROGRESS NOTES
Problem: Airway Clearance - Ineffective  Goal: Achieve or maintain patent airway  Outcome: Progressing Towards Goal     Problem: Gas Exchange - Impaired  Goal: Absence of hypoxia  Outcome: Progressing Towards Goal  Goal: Promote optimal lung function  Outcome: Progressing Towards Goal     Problem: Breathing Pattern - Ineffective  Goal: Ability to achieve and maintain a regular respiratory rate  Outcome: Progressing Towards Goal     Problem:  Body Temperature -  Risk of, Imbalanced  Goal: Ability to maintain a body temperature within defined limits  Outcome: Progressing Towards Goal  Goal: Will regain or maintain usual level of consciousness  Outcome: Progressing Towards Goal  Goal: Complications related to the disease process, condition or treatment will be avoided or minimized  Outcome: Progressing Towards Goal     Problem: Isolation Precautions - Risk of Spread of Infection  Goal: Prevent transmission of infectious organism to others  Outcome: Progressing Towards Goal     Problem: Nutrition Deficits  Goal: Optimize nutrtional status  Outcome: Progressing Towards Goal     Problem: Risk for Fluid Volume Deficit  Goal: Maintain normal heart rhythm  Outcome: Progressing Towards Goal  Goal: Maintain absence of muscle cramping  Outcome: Progressing Towards Goal  Goal: Maintain normal serum potassium, sodium, calcium, phosphorus, and pH  Outcome: Progressing Towards Goal     Problem: Loneliness or Risk for Loneliness  Goal: Demonstrate positive use of time alone when socialization is not possible  Outcome: Progressing Towards Goal     Problem: Fatigue  Goal: Verbalize increase energy and improved vitality  Outcome: Progressing Towards Goal     Problem: Patient Education: Go to Patient Education Activity  Goal: Patient/Family Education  Outcome: Progressing Towards Goal     Problem: Pressure Injury - Risk of  Goal: *Prevention of pressure injury  Description: Document Mike Scale and appropriate interventions in the flowsheet. Outcome: Progressing Towards Goal  Note: Pressure Injury Interventions:  Sensory Interventions: Assess changes in LOC    Moisture Interventions: Absorbent underpads,Apply protective barrier, creams and emollients,Assess need for specialty bed,Check for incontinence Q2 hours and as needed,Limit adult briefs,Maintain skin hydration (lotion/cream),Minimize layers,Moisture barrier,Offer toileting Q_hr    Activity Interventions: Assess need for specialty bed,Chair cushion,Increase time out of bed,Pressure redistribution bed/mattress(bed type),PT/OT evaluation    Mobility Interventions: Chair cushion,Float heels,HOB 30 degrees or less,Pressure redistribution bed/mattress (bed type),PT/OT evaluation,Turn and reposition approx. every two hours(pillow and wedges)    Nutrition Interventions: Document food/fluid/supplement intake,Offer support with meals,snacks and hydration    Friction and Shear Interventions: Feet elevated on foot rest,Foam dressings/transparent film/skin sealants,HOB 30 degrees or less,Lift sheet,Transfer aides:transfer board/Marisabel lift/ceiling lift,Transferring/repositioning devices       Problem: Patient Education: Go to Patient Education Activity  Goal: Patient/Family Education  Outcome: Progressing Towards Goal     Problem: Falls - Risk of  Goal: *Absence of Falls  Description: Document Kyle Fall Risk and appropriate interventions in the flowsheet. Outcome: Progressing Towards Goal     Problem: Patient Education: Go to Patient Education Activity  Goal: Patient/Family Education  Outcome: Progressing Towards Goal     Problem: Diabetes Self-Management  Goal: *Disease process and treatment process  Description: Define diabetes and identify own type of diabetes; list 3 options for treating diabetes.   Outcome: Progressing Towards Goal  Goal: *Incorporating nutritional management into lifestyle  Description: Describe effect of type, amount and timing of food on blood glucose; list 3 methods for planning meals. Outcome: Progressing Towards Goal  Goal: *Incorporating physical activity into lifestyle  Description: State effect of exercise on blood glucose levels. Outcome: Progressing Towards Goal  Goal: *Developing strategies to promote health/change behavior  Description: Define the ABC's of diabetes; identify appropriate screenings, schedule and personal plan for screenings. Outcome: Progressing Towards Goal  Goal: *Using medications safely  Description: State effect of diabetes medications on diabetes; name diabetes medication taking, action and side effects. Outcome: Progressing Towards Goal  Goal: *Monitoring blood glucose, interpreting and using results  Description: Identify recommended blood glucose targets  and personal targets. Outcome: Progressing Towards Goal  Goal: *Prevention, detection, treatment of acute complications  Description: List symptoms of hyper- and hypoglycemia; describe how to treat low blood sugar and actions for lowering  high blood glucose level. Outcome: Progressing Towards Goal  Goal: *Prevention, detection and treatment of chronic complications  Description: Define the natural course of diabetes and describe the relationship of blood glucose levels to long term complications of diabetes.   Outcome: Progressing Towards Goal  Goal: *Developing strategies to address psychosocial issues  Description: Describe feelings about living with diabetes; identify support needed and support network  Outcome: Progressing Towards Goal  Goal: *Insulin pump training  Outcome: Progressing Towards Goal  Goal: *Sick day guidelines  Outcome: Progressing Towards Goal  Goal: *Patient Specific Goal (EDIT GOAL, INSERT TEXT)  Outcome: Progressing Towards Goal     Problem: Patient Education: Go to Patient Education Activity  Goal: Patient/Family Education  Outcome: Progressing Towards Goal

## 2022-03-09 NOTE — PROGRESS NOTES
0700: Bedside report given to [id number] by [address] RN. Report included SBAR, MAR, Kardex, I/Os, Recent Results, and Cardiac Rhythm. 1900: Bedside report given to RN by [id number] Report included SBAR, MAR, Kardex, I/Os, Recent Results, and Cardiac Rhythm.     Abran Naik RN

## 2022-03-10 NOTE — WOUND CARE
Wound care Nurse consult: consult placed for sacral/buttocks redness. Patient is a 65 y/o CF admitted 3/1/22 for Acute hypoxic respiratory failure due to Covid 19. Past Medical History:   Diagnosis Date    Diabetes ([facility] [address]     Hypercholesterolemia     Hypertension      5', 137 lbs  BMI= 26  Incontinent of Urine - Purewick in use  High flow, 50 liters O2 NC  De-sats while talking and eating  [place] bed with air blower unit    Blanchable erythema to intact skin of buttocks and sacrum  Protective zinc cream applied by staff  Patient turned on right side    Recommend:  Skin Care & Pressure Prevention:  Minimize layers of linen/pads under patient to optimize support surface. Turn/reposition approximately every 2 hours and offload heels.   Manage incontinence / promote continence   Nourishing Skin Cream to dry skin, minimize use of briefs when able  John Goodwin RN, [employer]

## 2022-03-10 NOTE — PROGRESS NOTES
Transition of Care Plan:     RUR:14%  Disposition:Home with family.   Follow up appointments:set up with  new PCP at [facility] for April 20th. She has paperwork that must be sent in by 4/ 14 or appointment will be cancelled. I have called her friend Viri and let her know this and she says she will take care of it and make sure paperwork is done.   DME needed: She has no DME at home.   Transportation at Discharge:her friend Javier Nicholas or means to access home:   Viri     IM Medicare Letter:She will need second IM letter.   Is patient a BCPI-A Bundle:     N/A                 If yes, was Bundle Letter given?:    Is patient a [place] and connected with the [facility]? N/A               If yes, was [employer] transfer form completed and [facility] notified? Caregiver Contact:friend Viri Bishop . Viri has looked out for her the past 30 years. Also her niece Rebecca Goodwin .   Discharge Caregiver contacted prior to [address] needed?:  Not at this time      Patient remains in the pcu being monitored. She is currently on 50 liters oxygen. Desats while talking and eating. Not medically stable. Will continue to monitor. Sarah Madison  RN BSN CRM        [phone]

## 2022-03-10 NOTE — PROGRESS NOTES
Bedside and Verbal shift change report given to [address] [email or web address] [address]  (oncoming nurse) by Natalie Galindo RN (offgoing nurse). Report included the following information SBAR, Kardex, ED Summary, Intake/Output, Recent Results, Med Rec Status, Cardiac Rhythm NSR, Alarm Parameters  and Quality Measures. Nursing student Carlos Rg will be assisting with patient care.

## 2022-03-10 NOTE — PROGRESS NOTES
Hospitalist Progress Note    NAME: Deion Hollis   :  1950   MRN:  [id number]       Assessment / Plan:  Acute hypoxic respiratory failure due to  COVID-19 Pneumonia POA  Hypertension  -Coming with shortness of breath, cough and phlegm  -CT shows evidence of pulmonary edema versus infection  -Rapid COVID-19 test is positive  -Patient is unsure if she is vaccinated  -She is not volume overloaded  -Continue dexamethasone, dose increased to 20 mg daily 3/5 due to increased need for  -Oxygen requirement increased to 50 L high flow nasal cannula 3/5  -After 5 days of 20 mg of dexamethasone, dose now 10 mg daily  -Baricitinib twice daily for weeks started 3/2  -Procalcitonin level is only 0.1, repeat procalcitonin 0.07  -Appreciated pulmonary consult  -Hypotension is resolved  -Continue to hold antihypertensive medications      Diabetes mellitus type 2  -Hemoglobin A1c is 11.9  -Continue NPH, decreased the dose to 20 units twice daily as having hypoglycemic episodes  -We will c/w sliding scale insulin  -Hypoglycemia is improved     Hypertension  Dyslipidemia  -Continue hold home lisinopril, metoprolol due to hypotension  -Continue Lipitor        Code Status: To be DNR now  Surrogate Decision Maker:     DVT Prophylaxis: Lovenox  GI Prophylaxis: not indicated     Baseline: From home, independent of ADLs    Recommended Disposition: Home w/Family     Subjective:     Chief Complaint / Reason for Physician Visit  Remains on 50 L oxygen, blood pressure stable now. Denies any active complaints  Review of Systems:  Symptom Y/N Comments  Symptom Y/N Comments   Fever/Chills n   Chest Pain n    Poor Appetite n   Edema n    Cough n   Abdominal Pain n    Sputum n   Joint Pain     SOB/SALAS n   Pruritis/Rash     Nausea/vomit n   Tolerating PT/OT     Diarrhea n   Tolerating Diet     Constipation n   Other       Could NOT obtain due to:      Objective:     VITALS:   Last 24hrs VS reviewed since prior progress note.  Most recent are:  Patient Vitals for the past 24 hrs:   Temp Pulse Resp BP SpO2   03/10/22 0725     99 %   03/10/22 0457  63 21  92 %   03/10/22 0451  64 24  (!) 89 %   03/10/22 0441  65 22  (!) 84 %   03/10/22 0400 98.6 °F (37 °C) (!) 59 18 124/67 91 %   03/10/22 0303     93 %   03/09/22 2342     96 %   03/09/22 2304 97 °F (36.1 °C) (!) 59 20 122/64 98 %   03/09/22 2044     95 %   03/09/22 2000 97.3 °F (36.3 °C) 72 19 111/64 96 %   03/09/22 1600   20 128/69 95 %   03/09/22 1500  61 21     03/09/22 1458     95 %   03/09/22 1424     100 %   03/09/22 1107 97.7 °F (36.5 °C) 60 22 105/76 100 %   03/09/22 1106     98 %   03/09/22 0800 97.2 °F (36.2 °C) 60 21 138/73 97 %   03/09/22 0755     98 %       Intake/Output Summary (Last 24 hours) at 3/10/2022 0748  Last data filed at 3/9/2022 2000  Gross per 24 hour   Intake 300 ml   Output 350 ml   Net -50 ml        PHYSICAL EXAM:  General: WD, WN. Alert, cooperative, no acute distress    EENT:  EOMI. Anicteric sclerae. MMM  Resp:  CTA bilaterally, no wheezing or rales. No accessory muscle use  CV:  Regular  rhythm,  No edema  GI:  Soft, Non distended, Non tender.  +Bowel sounds  Neurologic:  Alert and oriented X 3, normal speech,   Psych:   Good insight. Not anxious nor agitated  Skin:  No rashes. No jaundice    Reviewed most current lab test results and cultures  YES  Reviewed most current radiology test results   YES  Review and summation of old records today    NO  Reviewed patient's current orders and MAR    YES  PMH/SH reviewed - no change compared to H&P  ________________________________________________________________________  Care Plan discussed with:    Comments   Patient x    Family      RN x    Care Manager     Consultant                       x Multidiciplinary team rounds were held today with , nursing, pharmacist and clinical coordinator.   Patient's plan of care was discussed; medications were reviewed and discharge planning was addressed. ________________________________________________________________________  Total NON critical care TIME:  30   Minutes    Total CRITICAL CARE TIME Spent:   Minutes non procedure based      Comments   >50% of visit spent in counseling and coordination of care     ________________________________________________________________________  Isis Merino MD     Procedures: see electronic medical records for all procedures/Xrays and details which were not copied into this note but were reviewed prior to creation of Plan. LABS:  I reviewed today's most current labs and imaging studies.   Pertinent labs include:  Recent Labs     03/10/22  0443 03/09/22  0202 03/08/22  0230   WBC 9.4 12.1* 8.7   HGB 12.3 13.1 12.7   HCT 36.4 37.5 37.1    413* 350     Recent Labs     03/10/22  0443 03/09/22  0202 03/08/22  0230   * 136 135*   K 4.6 4.4 4.6    105 105   CO2 26 27 27   * 53* 102*   BUN 30* 30* 31*   CREA 0.61 0.53* 0.55   CA 8.4* 8.5 8.4*       Signed: Isis Merino MD

## 2022-03-10 NOTE — PROGRESS NOTES
Pulmonary, Critical Care, and Sleep Medicine     Patient Consult    Name: Caremn Ogden MRN: [id number]   : 1950 Hospital: [facility]   Date: 3/10/2022        IMPRESSION:   · Acute Hypoxic Respiratory Failure, on high flow oxygen. Adjust to keep pox over 88%. Seems to be tolerating well. Seems to be improving. · Covid 19 Pneumonia, Severe multilobar infiltrates. Still on high level of oxygen. · Type 2 DM  · Hypertension  · Full Code. RECOMMENDATIONS:   · Continue current level of support, Wean oxygen to keep pox over 88%. · ON Decadron  · ON Olumiant  · ON Enoxaparin  · Glycemic control. · Tolerating regular diet. · Will ask PT and OT to assess and Rx. Subjective:   3-10-22: NO chest pain, no back pain. NO headaches. She feels like she is making progress. Sleeping well. NO fevers. 3-9-22: PT  Is alert and interactive this am. She is on high flow NC oxygen. NO chest pain, she has mild lower back pain, no leg pain, difficult to move in her bed. She feels that her breathing is comfortable. NO sinus pain or pressure. Has been tolerating the High flow NC. Tolerated eating her breakfast well this am.         This patient has been seen and evaluated at the request of Dr. Jud Rodriguez for above. Patient is a 66 y.o. female had difficulty with sleeping. Has a dry cough. NO headache. NO sinus pain or pressure. NO GERD. Lying in bed. She was ready to eat this am.   Feels fatigued.        Past Medical History:   Diagnosis Date    Diabetes ([address]     Hypercholesterolemia     Hypertension       Past Surgical History:   Procedure Laterality Date    HX GYN      HX HYSTERECTOMY N/A       Prior to Admission medications    Not on File     No Known Allergies   Social History     Tobacco Use    Smoking status: Former Smoker     Packs/day: 0.50     Years: 4.00     Pack years: 2.00     Quit date: 1975     Years since quittin.7    Smokeless tobacco: Never Used Substance Use Topics    Alcohol use: No      Family History   Problem Relation Age of Onset    Cancer Mother     Diabetes Mother     Heart Disease Mother     Diabetes Sister     Diabetes Sister     Diabetes Sister     Diabetes Sister     Cancer Sister         Current Facility-Administered Medications   Medication Dose Route Frequency    balsam peru-castor oiL (VENELEX) ointment   Topical BID    insulin NPH (NOVOLIN N, HUMULIN N) injection 20 Units  20 Units SubCUTAneous ACB&D    dexAMETHasone (DECADRON) tablet 10 mg  10 mg Oral DAILY    baricitinib (OLUMIANT) tablet 4 mg  4 mg Oral DAILY    aspirin chewable tablet 81 mg  81 mg Oral DAILY    [Held by provider] lisinopriL (PRINIVIL, ZESTRIL) tablet 10 mg  10 mg Oral DAILY    [Held by provider] metoprolol succinate (TOPROL-XL) XL tablet 50 mg  50 mg Oral DAILY    atorvastatin (LIPITOR) tablet 10 mg  10 mg Oral DAILY    sodium chloride (NS) flush 5-40 mL  5-40 mL IntraVENous Q8H    enoxaparin (LOVENOX) injection 40 mg  40 mg SubCUTAneous DAILY    insulin lispro (HUMALOG) injection   SubCUTAneous AC&HS       Review of Systems:  Constitutional: positive for fatigue and malaise  Eyes: negative  Ears, nose, mouth, throat, and face: negative  Respiratory: positive for cough or dyspnea on exertion  Cardiovascular: negative  Gastrointestinal: negative  Genitourinary:negative  Integument/breast: negative  Hematologic/lymphatic: negative  Musculoskeletal:negative  Neurological: negative  Behavioral/Psych: negative  Endocrine: negative  Allergic/Immunologic: negative    Objective:   Vital Signs:    Visit Vitals  /62 (BP 1 Location: Right upper arm, BP Patient Position: At rest)   Pulse 73   Temp 98 °F (36.7 °C)   Resp 19   Ht 5' (1.524 m)   Wt 62.2 kg (137 lb 1.6 oz)   SpO2 95%   Breastfeeding No   BMI 26.78 kg/m²       O2 Device: Heated,Hi flow nasal cannula   O2 Flow Rate (L/min): 50 l/min   Temp (24hrs), Av.9 °F (36.6 °C), Min:97 °F (36.1 °C), Max:98.6 °F (37 °C)       Intake/Output:   Last shift:      No intake/output data recorded. Last 3 shifts: 03/08 1901 - 03/10 0700  In: 300 [P.O.:300]  Out: 1350 [Urine:1350]    Intake/Output Summary (Last 24 hours) at 3/10/2022 1204  Last data filed at 3/9/2022 2000  Gross per 24 hour   Intake    Output 350 ml   Net -350 ml      Physical Exam:   General:  Alert, cooperative, no distress, appears stated age. On high flow oxygen. Head:  Normocephalic, without obvious abnormality, atraumatic. Eyes:  Conjunctivae/corneas clear. PERRL, EOMs intact. Nose: Nares normal. Septum midline. Mucosa normal. No drainage or sinus tenderness. Throat: Lips, mucosa, and tongue normal. Teeth and gums normal.   Neck: Supple, symmetrical, trachea midline, no adenopathy, thyroid: no enlargment/tenderness/nodules, no carotid bruit and no JVD. Back:   Symmetric, no curvature. ROM normal.   Lungs:   Clear to auscultation bilaterally. Chest wall:  No tenderness or deformity. Heart:  Regular rate and rhythm, S1, S2 normal, no murmur, click, rub or gallop. Abdomen:   Soft, non-tender. Bowel sounds normal. No masses,  No organomegaly. Extremities: Extremities normal, atraumatic, no cyanosis or edema. Pulses: 2+ and symmetric all extremities. Skin: Skin color, texture, turgor normal. No rashes or lesions   Lymph nodes: Cervical, supraclavicular, and axillary nodes normal.   Neurologic: Grossly nonfocal, motor is intact.       Data review:     Recent Results (from the past 24 hour(s))   GLUCOSE, POC    Collection Time: 03/09/22  4:34 PM   Result Value Ref Range    Glucose (POC) 102 65 - 117 mg/dL    Performed by Tomy FERNANDO    GLUCOSE, POC    Collection Time: 03/09/22 10:31 PM   Result Value Ref Range    Glucose (POC) 318 (H) 65 - 117 mg/dL    Performed by Graham Velazco RN    CBC W/O DIFF    Collection Time: 03/10/22  4:43 AM   Result Value Ref Range    WBC 9.4 3.6 - 11.0 K/uL    RBC 4.25 3.80 - 5.20 M/uL    HGB 12.3 11.5 - 16.0 g/dL    HCT 36.4 35.0 - 47.0 %    MCV 85.6 80.0 - 99.0 FL    MCH 28.9 26.0 - 34.0 PG    MCHC 33.8 30.0 - 36.5 g/dL    RDW 12.0 11.5 - 14.5 %    PLATELET 841 430 - 226 K/uL    MPV 9.9 8.9 - 12.9 FL    NRBC 0.0 0  WBC    ABSOLUTE NRBC 0.00 0.00 - 9.47 K/uL   METABOLIC PANEL, BASIC    Collection Time: 03/10/22  4:43 AM   Result Value Ref Range    Sodium 133 (L) 136 - 145 mmol/L    Potassium 4.6 3.5 - 5.1 mmol/L    Chloride 102 97 - 108 mmol/L    CO2 26 21 - 32 mmol/L    Anion gap 5 5 - 15 mmol/L    Glucose 182 (H) 65 - 100 mg/dL    BUN 30 (H) 6 - 20 MG/DL    Creatinine 0.61 0.55 - 1.02 MG/DL    BUN/Creatinine ratio 49 (H) 12 - 20      GFR est AA >60 >60 ml/min/1.73m2    GFR est non-AA >60 >60 ml/min/1.73m2    Calcium 8.4 (L) 8.5 - 10.1 MG/DL   GLUCOSE, POC    Collection Time: 03/10/22  7:18 AM   Result Value Ref Range    Glucose (POC) 117 65 - 117 mg/dL    Performed by [place] [facility] PCT    GLUCOSE, POC    Collection Time: 03/10/22 11:13 AM   Result Value Ref Range    Glucose (POC) 213 (H) 65 - 117 mg/dL    Performed by [place] [facility] PCT        Imaging:  I have personally reviewed the patients radiographs and have reviewed the reports:  3-1-22: CT of chest: IMPRESSION  1. No evidence for pulmonary embolism. 2. Bilateral groundglass opacification may be due to edema, pneumonia, or  hemorrhage and clinical correlation is recommended.   3. Cholelithiasis.           Ellen Pappas MD

## 2022-03-10 NOTE — PROGRESS NOTES
Physical Therapy    Arrived to see pt for session. Spoke with RN. Pt had again had to be increased on O2 and FiO2 percentage and is now being weaned down again after attempt to wean last night and is now at 50L at 80%. RN recommends holding off on activity this pm to allow her to assess how pt is doing at new level.  Will check back tomorrow for appropriateness for eval.    Kallie Cardoso, PT

## 2022-03-10 NOTE — PROGRESS NOTES
Attempted to see pt for OT services. Pt is again needing increased O2 and FiO2 percentage and is being attempted to be weaned back down at rest.  RN recommends deferring activity at this time. Will defer but continue to follow.

## 2022-03-10 NOTE — PROGRESS NOTES
1900: Bedside shift change report given to Gavino Hidalgo (oncoming nurse) by [id number] (offgoing nurse). Report included the following information SBAR and ED Summary. End of Shift Note    Bedside shift change report given to [address] (oncoming nurse) by Reynaldo Manrique RN (offgoing nurse). Report included the following information SBAR, Kardex, ED Summary, Intake/Output, MAR, Recent Results and Cardiac Rhythm NSR/SB    Shift worked:  [id number]     Shift summary and any significant changes:     Patient desats to 77% while talking and eating and sustaining on the low 80s; placed patient on NRB on top of the HHF, only helped a little bit with sats on 88%, called RT and weaned up to 50L 100%. Concerns for physician to address:  none at this time      [facility]          Activity:  Activity Level: Bed Rest  Number times ambulated in hallways past shift: 0  Number of times OOB to chair past shift: 0    Cardiac:   Cardiac Monitoring: Yes      Cardiac Rhythm: Sinus Gera    Access:   Current line(s): PIV     Genitourinary:   Urinary status: voiding and external catheter    Respiratory:   O2 Device: Heated,Hi flow nasal cannula  Chronic home O2 use?: YES  Incentive spirometer at bedside: NO       GI:  Last Bowel Movement Date: 03/07/22  Current diet:  DIET ONE TIME MESSAGE  ADULT ORAL NUTRITION SUPPLEMENT Breakfast, Dinner; Renal Supplement  ADULT DIET Easy to Chew; 4 carb choices (60 gm/meal); Chop all meats to make it easier to chew;any flavor Nepro shake  Passing flatus: YES  Tolerating current diet: YES       Pain Management:   Patient states pain is manageable on current regimen: YES    Skin:  Mike Score: 16  Interventions: float heels, increase time out of bed, PT/OT consult and internal/external urinary devices    Patient Safety:  Fall Score:  Total Score: 4  Interventions: bed/chair alarm, assistive device (walker, cane, etc), gripper socks, pt to call before getting OOB and stay with me (per policy)  High Fall Risk: Yes    Length of Stay:  Expected LOS: 5d 9h  Actual LOS: [address] [facility] Tamara, RN

## 2022-03-11 NOTE — PROGRESS NOTES
End of Shift Note    Bedside shift change report given to Gavino Hidalgo (oncoming nurse) by Wayne Magallon RN (offgoing nurse). Report included the following information SBAR, Kardex, Intake/Output, Med Rec Status, Cardiac Rhythm NSR, Alarm Parameters  and Quality Measures    Shift worked:  03/10     Shift summary and any significant changes:    Wean oxygen at a slower rate. Sacrum cream and nostril spray for prevention. Concerns for physician to address:  No new changes. Zone phone for oncoming shift:          Activity:  Activity Level: Bed Rest  Number times ambulated in hallways past shift: 0  Number of times OOB to chair past shift: 0    Cardiac:   Cardiac Monitoring: Yes      Cardiac Rhythm: Sinus Rhythm    Access:   Current line(s): PIV     Genitourinary:   Urinary status: voiding and external catheter    Respiratory:   O2 Device: Heated,Hi flow nasal cannula  Chronic home O2 use?: NO  Incentive spirometer at bedside: YES       GI:  Last Bowel Movement Date: 03/07/22  Current diet:  DIET ONE TIME MESSAGE  ADULT ORAL NUTRITION SUPPLEMENT Breakfast, Dinner; Renal Supplement  ADULT DIET Easy to Chew; 4 carb choices (60 gm/meal); Chop all meats to make it easier to chew;any flavor Nepro shake  Passing flatus: YES  Tolerating current diet: YES       Pain Management:   Patient states pain is manageable on current regimen: N/A    Skin:  Mike Score: 16  Interventions: turn team, float heels and internal/external urinary devices    Patient Safety:  Fall Score:  Total Score: 3  Interventions: bed/chair alarm and pt to call before getting OOB  High Fall Risk: Yes    Length of Stay:  Expected LOS: 5d 9h  Actual LOS: Flavio Segal RN

## 2022-03-11 NOTE — PROGRESS NOTES
Multiple attempts made to see pt for OT services. Pt continues to have respiratory decline. Spoke with palliative care nurse and pt is leaning towards possible comfort measures and no O2. Pt remains unable to maintain O2 sats at rest and is still requiring high volumes of heated HFNC and also needed BiPAP this morning. Will sign off. Please consult if pt stabilizes and is able to actively participate in services. Palliative care was also in agreement with OT signing off.

## 2022-03-11 NOTE — PROGRESS NOTES
Respiratory care; Was called to patient's room for respiratory distress, patient SATs were decompensating, spoke with Dr. Lauren Jaime Pulmonary and the Hospitalist regarding patient status. Palliative was called to re-evaluate patient needs and wishes.

## 2022-03-11 NOTE — CONSULTS
Palliative Medicine Consult  Yogesh: [phone] ([phone])    Patient Name: Richard Valentin  YOB: 1950    Date of Initial Consult: 3/11/22  Reason for Consult: Care Decisions  Requesting Provider: Mari Preciado MD  Primary Care Physician: None     SUMMARY:   Richard Valentin is a 66 y.o. with a past history of DM, HTN, HLD, who was admitted on 3/1/2022 from home with a diagnosis of acute hypoxic respiratory failure and suspected acute pulmonary edema. After admission she was tested for COVID and it resulted positive. She has been maintaining on high-flow around 50LPM at 70% FI02, but over the last 24 hours has been declining slowly. Today she really started to decompensate, and when I arrive she was so anxious and upset which was likely contributing to her low sats as well  Up until today she was able to eat and drink, although yesterday was less intake then the day before     PALLIATIVE DIAGNOSES:   1. Goals of care  2. Anxiety  3. Shortness of breath  4. Frailty  5. palliative     PLAN:   1. I met with her at bedside after talking to the attending and nursing. She is on a non-rebreather and highflow, and very adamantly wants the non-rebreather off and NO MASKS. She is OK with the highflow for the time being, but no escalation past that  2. She wants to take her oxygen off and be aloud to pass, but I asked if there was someone I could call prior to us doing this. She wants her friend and \"guardian ned\" Viri to be called. I spoke with Viri on the phone and shared the conversation I had with Mrs Curt Ortiz. She agreed to come to the [facility] [place] so that she could be here for support but also talk through next steps with Mrs Curt Ortiz  3. I shared with Mrs Curt Ortiz that Haile Duenas was coming, and if she decided after they talked, that she did want to stop all of this and be aloud to pass- we would support her in this. If not, that is OK too, and we can just implement some limits (such as bipap etc)  4.  She clearly shared that if she looses capacity this admission, Lamonte Ferrell is who she would want to make medical decisions for her. I did not have her do an AMD today given how anxious she is. If she does not opt for CMO today, and stabilized, I will do one with her prior to discharge. 5. Will update the plan after I meet with Viri  6. I did tell OT that it was not a good day to evaluate her, and we can always re-consult if she stabilizes  7. Initial consult note routed to primary continuity provider and/or primary health care team members  8. Communicated plan of care with: Palliative IDT, Edis Cochran Team    Addendum:  Met with Viri when she arrived, at this point Ms Dana Nelson is much calmer, appears comfortable, and oxygen saturations were 95-99%. I shared with her again the events of this morning, and encouraged her to talk with Mrs Dana Nelson, but that we would respect whatever decision she would make. Will continue with the limit of no masks, and if she deteriorates again and cannot recover with emotional support, will re-address comfort at that time. Will follow up tomorrow    Addendum: met at bedside again with Viri and Mrs Dana Nelson- talked through [address] [place] [address] again with her, but under the context of not likely able to get her home to pass. We also talked about giving this a little more time to see if she can get her oxygen requirements down. Mrs Dana Nelson is not open at this time to passing away in the hospital- she wants to get home. Would recommend we utilize meds to help facilitate comfort as she has high anxiety, and I did let Viri know she can come whenever she wants as I can see that she is a very claming presence for Mrs Dana Nelson. She remains clear that she does not want any other oxygen delivery system then high flow or nasal canula.   NO MASKS     GOALS OF CARE / TREATMENT PREFERENCES:     GOALS OF CARE:  Patient/Health Care Proxy Stated Goals: Comfort    TREATMENT PREFERENCES:   Code Status: DNR    Advance Care Planning:  [x] The [facility] Interdisciplinary Team has updated the ACP Navigator with Health Care Decision Maker and Patient Capacity      Primary Decision Maker: Parvin Graham - Other Non-relative - [phone]    Medical Interventions: Limited additional interventions       Other:    As far as possible, the palliative care team has discussed with patient / health care proxy about goals of care / treatment preferences for patient. HISTORY:     History obtained from: chart, patient    CHIEF COMPLAINT: \"I just want to take this oxygen off and stop breathing\"    HPI/SUBJECTIVE:    The patient is:   [x] Verbal and participatory  [] Non-participatory due to:     Clinical Pain Assessment (nonverbal scale for severity on nonverbal patients):   Clinical Pain Assessment  Severity: 0          Duration: for how long has pt been experiencing pain (e.g., 2 days, 1 month, years)  Frequency: how often pain is an issue (e.g., several times per day, once every few days, constant)     FUNCTIONAL ASSESSMENT:     Palliative Performance Scale (PPS):  PPS: 20       PSYCHOSOCIAL/SPIRITUAL SCREENING:     Palliative IDT has assessed this patient for cultural preferences / practices and a referral made as appropriate to needs (Cultural Services, Patient Advocacy, Ethics, etc.)    Any spiritual / Amish concerns:  [] Yes /  [x] No   If \"Yes\" to discuss with pastoral care during IDT     Does caregiver feel burdened by caring for their loved one:   [] Yes /  [x] No /  [] No Caregiver Present    If \"Yes\" to discuss with social work during IDT    Anticipatory grief assessment:   [x] Normal  / [] Maladaptive     If \"Maladaptive\" to discuss with social work during IDT    ESAS Anxiety: Anxiety: 8    ESAS Depression: Depression: 0        REVIEW OF SYSTEMS:     Positive and pertinent negative findings in ROS are noted above in HPI.   The following systems were [x] reviewed / [] unable to be reviewed as noted in HPI  Other findings are noted below.  Systems: constitutional, ears/nose/mouth/throat, respiratory, gastrointestinal, genitourinary, musculoskeletal, integumentary, neurologic, psychiatric, endocrine. Positive findings noted below. Modified ESAS Completed by: provider   Fatigue: 2     Depression: 0 Pain: 0   Anxiety: 8 Nausea: 0   Anorexia: 8 Dyspnea: 6           Stool Occurrence(s): 1        PHYSICAL EXAM:     From RN flowsheet:  Wt Readings from Last 3 Encounters:   03/10/22 137 lb 1.6 oz (62.2 kg)   02/23/22 148 lb 15.8 oz (67.6 kg)   07/14/20 150 lb (68 kg)     Blood pressure 138/68, pulse (!) 53, temperature 98 °F (36.7 °C), resp. rate 18, height 5' (1.524 m), weight 137 lb 1.6 oz (62.2 kg), SpO2 93 %, not currently breastfeeding.     Pain Scale 1: Numeric (0 - 10)  Pain Intensity 1: 0  Pain Onset 1: acute  Pain Location 1: Back  Pain Orientation 1: Mid  Pain Description 1: Aching  Pain Intervention(s) 1: Medication (see MAR)  Last bowel movement, if known:     Constitutional: awake, alert, anxious, scared  Eyes: pupils equal, anicteric  ENMT: no nasal discharge, moist mucous membranes  Cardiovascular: regular rhythm, distal pulses intact  Respiratory: breathing labored, symmetric  Gastrointestinal: soft non-tender, +bowel sounds  Musculoskeletal: no deformity, no tenderness to palpation  Skin: warm, dry  Neurologic: following commands, moving all extremities  Psychiatric: anxious affect, no hallucinations  Other:       HISTORY:     Active Problems:    Acute pulmonary edema (HCC) (3/1/2022)      Past Medical History:   Diagnosis Date    Diabetes ([facility] [address]     Hypercholesterolemia     Hypertension       Past Surgical History:   Procedure Laterality Date    HX GYN      HX HYSTERECTOMY N/A       Family History   Problem Relation Age of Onset    Cancer Mother     Diabetes Mother     Heart Disease Mother     Diabetes Sister     Diabetes Sister     Diabetes Sister     Diabetes Sister     Cancer Sister       History reviewed, no pertinent family history.   Social History     Tobacco Use    Smoking status: Former Smoker     Packs/day: 0.50     Years: 4.00     Pack years: 2.00     Quit date: 1975     Years since quittin.7    Smokeless tobacco: Never Used   Substance Use Topics    Alcohol use: No     No Known Allergies   Current Facility-Administered Medications   Medication Dose Route Frequency    LORazepam (ATIVAN) injection 1 mg  1 mg IntraVENous TID PRN    balsam peru-castor oiL (VENELEX) ointment   Topical BID    sodium chloride (OCEAN) 0.65 % nasal squeeze bottle 2 Spray  2 Spray Both Nostrils Q2H PRN    insulin NPH (NOVOLIN N, HUMULIN N) injection 20 Units  20 Units SubCUTAneous ACB&D    dexAMETHasone (DECADRON) tablet 10 mg  10 mg Oral DAILY    ipratropium-albuterol (COMBIVENT RESPIMAT) 20 mcg-100 mcg inhalation spray  1 Puff Inhalation Q6H PRN    albuterol (PROVENTIL HFA, VENTOLIN HFA, PROAIR HFA) inhaler 2 Puff  2 Puff Inhalation Q4H PRN    baricitinib (OLUMIANT) tablet 4 mg  4 mg Oral DAILY    aspirin chewable tablet 81 mg  81 mg Oral DAILY    [Held by provider] lisinopriL (PRINIVIL, ZESTRIL) tablet 10 mg  10 mg Oral DAILY    [Held by provider] metoprolol succinate (TOPROL-XL) XL tablet 50 mg  50 mg Oral DAILY    atorvastatin (LIPITOR) tablet 10 mg  10 mg Oral DAILY    sodium chloride (NS) flush 5-40 mL  5-40 mL IntraVENous Q8H    sodium chloride (NS) flush 5-40 mL  5-40 mL IntraVENous PRN    acetaminophen (TYLENOL) tablet 650 mg  650 mg Oral Q6H PRN    Or    acetaminophen (TYLENOL) suppository 650 mg  650 mg Rectal Q6H PRN    polyethylene glycol (MIRALAX) packet 17 g  17 g Oral DAILY PRN    ondansetron (ZOFRAN ODT) tablet 4 mg  4 mg Oral Q8H PRN    Or    ondansetron (ZOFRAN) injection 4 mg  4 mg IntraVENous Q6H PRN    enoxaparin (LOVENOX) injection 40 mg  40 mg SubCUTAneous DAILY    insulin lispro (HUMALOG) injection   SubCUTAneous AC&HS    glucose chewable tablet 16 g  4 Tablet Oral PRN    glucagon (GLUCAGEN) injection 1 mg  1 mg IntraMUSCular PRN    dextrose 10 % infusion 0-250 mL  0-250 mL IntraVENous PRN          LAB AND IMAGING FINDINGS:     Lab Results   Component Value Date/Time    WBC 12.1 (H) 03/11/2022 02:18 AM    HGB 13.0 03/11/2022 02:18 AM    PLATELET 845 04/75/0887 02:18 AM     Lab Results   Component Value Date/Time    Sodium 134 (L) 03/11/2022 02:18 AM    Potassium 4.8 03/11/2022 02:18 AM    Chloride 102 03/11/2022 02:18 AM    CO2 29 03/11/2022 02:18 AM    BUN 30 (H) 03/11/2022 02:18 AM    Creatinine 0.58 03/11/2022 02:18 AM    Calcium 8.4 (L) 03/11/2022 02:18 AM    Magnesium 1.8 08/14/2009 04:15 AM      Lab Results   Component Value Date/Time    Alk. phosphatase 120 (H) 03/01/2022 02:20 PM    Protein, total 7.6 03/01/2022 02:20 PM    Albumin 2.6 (L) 03/01/2022 02:20 PM    Globulin 5.0 (H) 03/01/2022 02:20 PM     No results found for: INR, PTMR, PTP, PT1, PT2, APTT, INREXT, INREXT   No results found for: IRON, FE, TIBC, IBCT, PSAT, FERR   No results found for: PH, PCO2, PO2  No components found for: [place]   Lab Results   Component Value Date/Time    CK 41 08/12/2009 05:15 PM    CK - MB 1.0 08/12/2009 05:15 PM                Total time: 70m  Counseling / coordination time, spent as noted above: 50m  > 50% counseling / coordination?: y    Prolonged service was provided for  []30 min   []75 min in face to face time in the presence of the patient, spent as noted above. Time Start:   Time End:   Note: this can only be billed with 67208 (initial) or 28171 (follow up). If multiple start / stop times, list each separately.

## 2022-03-11 NOTE — PROGRESS NOTES
Spiritual Care Assessment/Progress Note  [facility]      NAME: Luz Mathur      MRN: [id number]  AGE: 66 y.o. SEX: female  Yazdanism Affiliation: No Temple   Language: English     3/11/2022     Total Time (in minutes): 10     Spiritual Assessment begun in [facility] PROGRESSIVE CARE through conversation with:         [x]Patient        [] Family    [] Friend(s)        Reason for Consult: Palliative Care, Initial/Spiritual Assessment     Spiritual beliefs: (Please include comment if needed)     [] Identifies with a thomas tradition:         [] Supported by a thomas community:            [] Claims no spiritual orientation:           [] Seeking spiritual identity:                [] Adheres to an individual form of spirituality:           [x] Not able to assess:                           Identified resources for coping:      [] Prayer                               [] Music                  [] Guided Imagery     [] Family/friends                 [] Pet visits     [] Devotional reading                         [x] Unknown     [] Other:                                               Interventions offered during this visit: (See comments for more details)    Patient Interventions: Initial visit           Plan of Care:     [x] Support spiritual and/or cultural needs    [] Support AMD and/or advance care planning process      [] Support grieving process   [] Coordinate Rites and/or Rituals    [] Coordination with community clergy   [] No spiritual needs identified at this time   [] Detailed Plan of Care below (See Comments)  [] Make referral to Music Therapy  [] Make referral to Pet Therapy     [] Make referral to Addiction services  [] Make referral to [facility]  [] Make referral to Spiritual Care Partner  [] No future visits requested        [x] Contact Spiritual Care for further referrals     Comments:  Reviewed chart prior to visit on PCU for spiritual assessment. No family/friends present.  Patient is on COVID+ contact precautions;  did not enter room. Patient could be viewed through hallway window; she appeared to be sleeping soundly.  did not attempt to wake her at this time.    available upon referral by nurse or by patient request.       MO Rodgers, [facility], Staff [address] [place] [address] Avenue    [address] [place] [address] Road Paging Service  [phone])

## 2022-03-11 NOTE — PROGRESS NOTES
Hospitalist Progress Note    NAME: Lior Montague   :  1950   MRN:  [id number]       Assessment / Plan:  Acute hypoxic respiratory failure due to  COVID-19 Pneumonia POA  Hypertension  -Coming with shortness of breath, cough and phlegm  -CT shows evidence of pulmonary edema versus infection  -Rapid COVID-19 test is positive, procal low.    -Patient is unsure if she is vaccinated  -Continue dexamethasone, baricitinib  -Procalcitonin level is only 0.1, repeat procalcitonin 0.07  -Appreciated pulmonary consult  -Continue to hold antihypertensive medications  -currently on HF and bipap, not tolerating bipap well due to anxiety. Adding ativan  -palliative is consulted for [address] discussion    Diabetes mellitus type 2  -Hemoglobin A1c is 11.9  -Continue NPH, titrate prn. SSI     Hypertension  Dyslipidemia  -Continue hold home lisinopril, metoprolol due to hypotension  -Continue Lipitor        Code Status: To be DNR now  Surrogate Decision Maker:   DVT Prophylaxis: Lovenox  GI Prophylaxis: not indicated     Baseline: From home, independent of ADLs    Recommended Disposition: Home w/Family     Subjective:     Chief Complaint / Reason for Physician Visit  Pt is currently on 50-60LHF, bipap attempted but pt was not tolerating well. Review of Systems:  Symptom Y/N Comments  Symptom Y/N Comments   Fever/Chills n   Chest Pain n    Poor Appetite n   Edema n    Cough n   Abdominal Pain n    Sputum n   Joint Pain     SOB/SALAS n   Pruritis/Rash     Nausea/vomit n   Tolerating PT/OT     Diarrhea n   Tolerating Diet     Constipation n   Other       Could NOT obtain due to:      Objective:     VITALS:   Last 24hrs VS reviewed since prior progress note.  Most recent are:  Patient Vitals for the past 24 hrs:   Temp Pulse Resp BP SpO2   22 1338     92 %   22 1111 98 °F (36.7 °C) 76 29 (!) 102/51 93 %   22 1052     93 %   22 1012     91 %   22 0849     93 %   22 0800  (!) 57 20 (!) 111/59 97 %   03/11/22 0700  (!) 53 13  96 %   03/11/22 0600  (!) 55 16  96 %   03/11/22 0400 98 °F (36.7 °C) (!) 53 18 138/68 98 %   03/11/22 0335     99 %   03/11/22 0000     95 %   03/10/22 2311 98.2 °F (36.8 °C) 60 15 135/78 96 %   03/10/22 1957     90 %   03/10/22 1917 97.9 °F (36.6 °C) 66 22 106/64 99 %   03/10/22 1800  69 20  94 %   03/10/22 1600  61 23 (!) 110/58    03/10/22 1507 97.8 °F (36.6 °C) 62 24 111/62 97 %       Intake/Output Summary (Last 24 hours) at 3/11/2022 1407  Last data filed at 3/11/2022 0400  Gross per 24 hour   Intake    Output 250 ml   Net -250 ml        PHYSICAL EXAM:  General: WD, WN. Alert, cooperative, no acute distress    EENT:  EOMI. Anicteric sclerae. MMM  Resp:   No accessory muscle use  CV:  Regular  rhythm,  No edema  GI:  Soft, Non distended, Non tender.  +Bowel sounds  Neurologic:  Alert and oriented X 3, normal speech,   Psych:   Anxious+  Skin:  No rashes. No jaundice    Reviewed most current lab test results and cultures  YES  Reviewed most current radiology test results   YES  Review and summation of old records today    NO  Reviewed patient's current orders and MAR    YES  PMH/SH reviewed - no change compared to H&P  ________________________________________________________________________  Care Plan discussed with:    Comments   Patient x    Family      RN x    Care Manager     Consultant  x Palliative/pulm                    x Multidiciplinary team rounds were held today with , nursing, pharmacist and clinical coordinator. Patient's plan of care was discussed; medications were reviewed and discharge planning was addressed.      ________________________________________________________________________  Total NON critical care TIME:  30   Minutes    Total CRITICAL CARE TIME Spent:   Minutes non procedure based      Comments   >50% of visit spent in counseling and coordination of care ________________________________________________________________________  Diandra Stuart MD     Procedures: see electronic medical records for all procedures/Xrays and details which were not copied into this note but were reviewed prior to creation of Plan. LABS:  I reviewed today's most current labs and imaging studies.   Pertinent labs include:  Recent Labs     03/11/22  0218 03/10/22  0443 03/09/22  0202   WBC 12.1* 9.4 12.1*   HGB 13.0 12.3 13.1   HCT 38.9 36.4 37.5    348 413*     Recent Labs     03/11/22  0218 03/10/22  0443 03/09/22  0202   * 133* 136   K 4.8 4.6 4.4    102 105   CO2 29 26 27   * 182* 53*   BUN 30* 30* 30*   CREA 0.58 0.61 0.53*   CA 8.4* 8.4* 8.5       Signed: Diandra Stuart MD

## 2022-03-11 NOTE — PROGRESS NOTES
Pulmonary, Critical Care, and Sleep Medicine     Patient Consult    Name: Carmen Ogden MRN: [id number]   : 1950 Hospital: [facility]   Date: 3/11/2022        IMPRESSION:   · Acute Hypoxic Respiratory Failure, on high flow oxygen. Adjust to keep pox over 88%. Seems to be tolerating well. Seems to be improving. · Covid 19 Pneumonia, Severe multilobar infiltrates. Still on high level of oxygen. · Type 2 DM  · Hypertension  · Full Code. RECOMMENDATIONS:   · Continue current level of support, Wean oxygen to keep pox over 88%. · ON Decadron  · ON Olumiant  · ON Enoxaparin  · Glycemic control. · Tolerating regular diet. · Will get CXR    Now to look for interval changes. · Agree with palliative care assistance as well. · Discussed with RT, nursing, IM and palliative care. Subjective:   3-11-22: Pt has been feeling about the same but has worsening desaturation. Had to be placed on bipap this am. She denies any issues with aspiration. NO chest pain, no back pain. NO headache.     3-10-22: NO chest pain, no back pain. NO headaches. She feels like she is making progress. Sleeping well. NO fevers. 3-9-22: PT  Is alert and interactive this am. She is on high flow NC oxygen. NO chest pain, she has mild lower back pain, no leg pain, difficult to move in her bed. She feels that her breathing is comfortable. NO sinus pain or pressure. Has been tolerating the High flow NC. Tolerated eating her breakfast well this am.         This patient has been seen and evaluated at the request of Dr. Jud Rodriguez for above. Patient is a 66 y.o. female had difficulty with sleeping. Has a dry cough. NO headache. NO sinus pain or pressure. NO GERD. Lying in bed. She was ready to eat this am.   Feels fatigued.        Past Medical History:   Diagnosis Date    Diabetes ([address]     Hypercholesterolemia     Hypertension       Past Surgical History:   Procedure Laterality Date    HX GYN  HX HYSTERECTOMY N/A       Prior to Admission medications    Not on File     No Known Allergies   Social History     Tobacco Use    Smoking status: Former Smoker     Packs/day: 0.50     Years: 4.00     Pack years: 2.00     Quit date: 1975     Years since quittin.7    Smokeless tobacco: Never Used   Substance Use Topics    Alcohol use: No      Family History   Problem Relation Age of Onset    Cancer Mother     Diabetes Mother     Heart Disease Mother     Diabetes Sister     Diabetes Sister     Diabetes Sister     Diabetes Sister     Cancer Sister         Current Facility-Administered Medications   Medication Dose Route Frequency    balsam peru-castor oiL (VENELEX) ointment   Topical BID    insulin NPH (NOVOLIN N, HUMULIN N) injection 20 Units  20 Units SubCUTAneous ACB&D    dexAMETHasone (DECADRON) tablet 10 mg  10 mg Oral DAILY    baricitinib (OLUMIANT) tablet 4 mg  4 mg Oral DAILY    aspirin chewable tablet 81 mg  81 mg Oral DAILY    [Held by provider] lisinopriL (PRINIVIL, ZESTRIL) tablet 10 mg  10 mg Oral DAILY    [Held by provider] metoprolol succinate (TOPROL-XL) XL tablet 50 mg  50 mg Oral DAILY    atorvastatin (LIPITOR) tablet 10 mg  10 mg Oral DAILY    sodium chloride (NS) flush 5-40 mL  5-40 mL IntraVENous Q8H    enoxaparin (LOVENOX) injection 40 mg  40 mg SubCUTAneous DAILY    insulin lispro (HUMALOG) injection   SubCUTAneous AC&HS       Review of Systems:  Constitutional: positive for fatigue and malaise  Eyes: negative  Ears, nose, mouth, throat, and face: negative  Respiratory: positive for cough or dyspnea on exertion  Cardiovascular: negative  Gastrointestinal: negative  Genitourinary:negative  Integument/breast: negative  Hematologic/lymphatic: negative  Musculoskeletal:negative  Neurological: negative  Behavioral/Psych: negative  Endocrine: negative  Allergic/Immunologic: negative    Objective:   Vital Signs:    Visit Vitals  BP (!) 102/51 (BP 1 Location: Right upper arm, BP Patient Position: At rest)   Pulse 76   Temp 98 °F (36.7 °C)   Resp 29   Ht 5' (1.524 m)   Wt 62.2 kg (137 lb 1.6 oz)   SpO2 93%   Breastfeeding No   BMI 26.78 kg/m²       O2 Device: Non-rebreather mask   O2 Flow Rate (L/min): 15 l/min   Temp (24hrs), Av °F (36.7 °C), Min:97.8 °F (36.6 °C), Max:98.2 °F (36.8 °C)       Intake/Output:   Last shift:      No intake/output data recorded. Last 3 shifts:  1901 -  0700  In: -   Out: 600 [Urine:600]    Intake/Output Summary (Last 24 hours) at 3/11/2022 1212  Last data filed at 3/11/2022 0400  Gross per 24 hour   Intake    Output 250 ml   Net -250 ml      Physical Exam:   General:  Alert, cooperative, no distress, appears stated age. On high flow oxygen. Head:  Normocephalic, without obvious abnormality, atraumatic. Eyes:  Conjunctivae/corneas clear. PERRL, EOMs intact. Nose: Nares normal. Septum midline. Mucosa normal. No drainage or sinus tenderness. Throat: Lips, mucosa, and tongue normal. Teeth and gums normal.   Neck: Supple, symmetrical, trachea midline, no adenopathy, thyroid: no enlargment/tenderness/nodules, no carotid bruit and no JVD. Back:   Symmetric, no curvature. ROM normal.   Lungs:   Clear to auscultation bilaterally. Chest wall:  No tenderness or deformity. Heart:  Regular rate and rhythm, S1, S2 normal, no murmur, click, rub or gallop. Abdomen:   Soft, non-tender. Bowel sounds normal. No masses,  No organomegaly. Extremities: Extremities normal, atraumatic, no cyanosis or edema. Pulses: 2+ and symmetric all extremities. Skin: Skin color, texture, turgor normal. No rashes or lesions   Lymph nodes: Cervical, supraclavicular, and axillary nodes normal.   Neurologic: Grossly nonfocal, motor is intact.       Data review:     Recent Results (from the past 24 hour(s))   GLUCOSE, POC    Collection Time: 03/10/22  4:44 PM   Result Value Ref Range    Glucose (POC) 141 (H) 65 - 117 mg/dL    Performed by Martita Sargent PCT    GLUCOSE, POC    Collection Time: 03/10/22  9:31 PM   Result Value Ref Range    Glucose (POC) 328 (H) 65 - 117 mg/dL    Performed by Adilia Oh RN    CBC W/O DIFF    Collection Time: 03/11/22  2:18 AM   Result Value Ref Range    WBC 12.1 (H) 3.6 - 11.0 K/uL    RBC 4.60 3.80 - 5.20 M/uL    HGB 13.0 11.5 - 16.0 g/dL    HCT 38.9 35.0 - 47.0 %    MCV 84.6 80.0 - 99.0 FL    MCH 28.3 26.0 - 34.0 PG    MCHC 33.4 30.0 - 36.5 g/dL    RDW 12.1 11.5 - 14.5 %    PLATELET 492 475 - 289 K/uL    MPV 9.9 8.9 - 12.9 FL    NRBC 0.0 0  WBC    ABSOLUTE NRBC 0.00 0.00 - 6.52 K/uL   METABOLIC PANEL, BASIC    Collection Time: 03/11/22  2:18 AM   Result Value Ref Range    Sodium 134 (L) 136 - 145 mmol/L    Potassium 4.8 3.5 - 5.1 mmol/L    Chloride 102 97 - 108 mmol/L    CO2 29 21 - 32 mmol/L    Anion gap 3 (L) 5 - 15 mmol/L    Glucose 169 (H) 65 - 100 mg/dL    BUN 30 (H) 6 - 20 MG/DL    Creatinine 0.58 0.55 - 1.02 MG/DL    BUN/Creatinine ratio 52 (H) 12 - 20      GFR est AA >60 >60 ml/min/1.73m2    GFR est non-AA >60 >60 ml/min/1.73m2    Calcium 8.4 (L) 8.5 - 10.1 MG/DL   GLUCOSE, POC    Collection Time: 03/11/22  7:17 AM   Result Value Ref Range    Glucose (POC) 84 65 - 117 mg/dL    Performed by Elio Hanley PCT    GLUCOSE, POC    Collection Time: 03/11/22 11:11 AM   Result Value Ref Range    Glucose (POC) 193 (H) 65 - 117 mg/dL    Performed by Elio Hanley PCT        Imaging:  I have personally reviewed the patients radiographs and have reviewed the reports:  3-1-22: CT of chest: IMPRESSION  1. No evidence for pulmonary embolism. 2. Bilateral groundglass opacification may be due to edema, pneumonia, or  hemorrhage and clinical correlation is recommended.   3. Cholelithiasis.           Dexter Varela MD

## 2022-03-11 NOTE — PROGRESS NOTES
Physical Therapy    Multiple attempts made to see pt for PT services. Pt continues to have respiratory decline. Spoke with palliative care nurse and pt is leaning towards possible comfort measures and no O2. Pt remains unable to maintain O2 sats at rest and is still requiring high volumes of heated HFNC and also needed BiPAP this morning. Will sign off. Please consult if pt stabilizes and is able to actively participate in services. Palliative care was also in agreement with PT signing off.

## 2022-03-11 NOTE — PROGRESS NOTES
1900: Bedside shift change report given to Gavino Hidalgo (oncoming nurse) by Estrada Mendoza RN (offgoing nurse). Report included the following information SBAR, Kardex, ED Summary, Intake/Output, MAR, Recent Results and Cardiac Rhythm NSR. End of Shift Note    Bedside shift change report given to [address] [place] [address] (oncoming nurse) by Shalom Low RN (offgoing nurse). Report included the following information SBAR, Kardex, ED Summary, Intake/Output, MAR, Recent Results and Cardiac Rhythm NSR    Shift worked:  [id number]     Shift summary and any significant changes:     elevated WBC; patient stayed on HHF 50L, RT weaned as tolerated. Concerns for physician to address:  none at this time      [facility]          Activity:  Activity Level: Bed Rest  Number times ambulated in hallways past shift: 0  Number of times OOB to chair past shift: 0    Cardiac:   Cardiac Monitoring: Yes      Cardiac Rhythm: Sinus Rhythm    Access:   Current line(s): PIV     Genitourinary:   Urinary status: voiding and external catheter    Respiratory:   O2 Device: Heated,Hi flow nasal cannula  Chronic home O2 use?: YES  Incentive spirometer at bedside: NO       GI:  Last Bowel Movement Date: 03/07/22  Current diet:  DIET ONE TIME MESSAGE  ADULT ORAL NUTRITION SUPPLEMENT Breakfast, Dinner; Renal Supplement  ADULT DIET Easy to Chew; 4 carb choices (60 gm/meal); Chop all meats to make it easier to chew;any flavor Nepro shake  Passing flatus: YES  Tolerating current diet: YES       Pain Management:   Patient states pain is manageable on current regimen: YES    Skin:  Mike Score: 16  Interventions: speciality bed, float heels, increase time out of bed, PT/OT consult and internal/external urinary devices    Patient Safety:  Fall Score:  Total Score: 3  Interventions: bed/chair alarm, assistive device (walker, cane, etc), gripper socks, pt to call before getting OOB and stay with me (per policy)  High Fall Risk: Yes    Length of Stay:  Expected LOS: 5d 9h  Actual LOS: 82 Ruth Demarco RN

## 2022-03-12 NOTE — PROGRESS NOTES
Low blood glucose 56 noted. Glucose tablets given. Patient took 2 tablets and DeSaT. Dextrose 10% given and BG improved.

## 2022-03-12 NOTE — PROGRESS NOTES
End of Shift Note    Bedside shift change report given to SOHAN SU (oncoming nurse) by Leila Chen RN (offgoing nurse). Report included the following information SBAR, Kardex, Intake/Output, MAR, Accordion, Med Rec Status, Cardiac Rhythm NSR, Alarm Parameters , Procedure Verification and Quality Measures    Shift worked:  03/11     Shift summary and any significant changes:     Palliative consulted. Pt does not want ANY masks including NRB and Bipap. Possible comfort care. Concerns for physician to address:      Zone phone for oncoming shift:       Activity:  Activity Level: Bed Rest  Number times ambulated in hallways past shift: 0  Number of times OOB to chair past shift: 0    Cardiac:   Cardiac Monitoring: Yes      Cardiac Rhythm: Sinus Rhythm    Access:   Current line(s): PIV     Genitourinary:   Urinary status: voiding and external catheter    Respiratory:   O2 Device: Heated,Hi flow nasal cannula  Chronic home O2 use?: NO  Incentive spirometer at bedside: YES       GI:  Last Bowel Movement Date: 03/11/22  Current diet:  DIET ONE TIME MESSAGE  ADULT ORAL NUTRITION SUPPLEMENT Breakfast, Dinner; Renal Supplement  ADULT DIET Easy to Chew; 4 carb choices (60 gm/meal); Chop all meats to make it easier to chew;any flavor Nepro shake  Passing flatus: YES  Tolerating current diet: YES       Pain Management:   Patient states pain is manageable on current regimen: N/A    Skin:  Mike Score: 16  Interventions: speciality bed, float heels, limit briefs and internal/external urinary devices    Patient Safety:  Fall Score:  Total Score: 3  Interventions: bed/chair alarm, assistive device (walker, cane, etc) and pt to call before getting OOB  High Fall Risk: Yes    Length of Stay:  Expected LOS: 5d 9h  Actual LOS: 300 Joselin Adams RN

## 2022-03-12 NOTE — PROGRESS NOTES
Pt sister came to visit and provided her number if needed to be contacted. Cornelio Crawford [phone].

## 2022-03-12 NOTE — PROGRESS NOTES
Hospitalist Progress Note    NAME: Dali Dutton   :  1950   MRN:  [id number]       Assessment / Plan:  Acute hypoxic respiratory failure due to  COVID-19 Pneumonia POA  Hypertension  -Coming with shortness of breath, cough and phlegm  -CT shows evidence of pulmonary edema versus infection  -Rapid COVID-19 test is positive, procal low.    -Patient is unsure if she is vaccinated  -Continue dexamethasone, baricitinib  -Procalcitonin level is only 0.1, repeat procalcitonin 0.07  -Appreciated pulmonary consult  -Continue to hold antihypertensive medications  -cont' to wean O2. Pt is still requiring HF. Pt is refusing bipap, NRB mask. Cont' ativan prn  -palliative is consulted for [address] discussion    Diabetes mellitus type 2  -Hemoglobin A1c is 11.9  -Continue NPH, titrate prn. SSI     Hypertension  Dyslipidemia  -Continue hold home lisinopril, metoprolol due to hypotension  -Continue Lipitor        Code Status: To be DNR now  Surrogate Decision Maker: pt's friend  DVT Prophylaxis: Lovenox  GI Prophylaxis: not indicated     Baseline: From home, independent of ADLs    Recommended Disposition: Home w/Family     Subjective:     Chief Complaint / Reason for Physician Visit  NAD. No changes overnight. Review of Systems:  Symptom Y/N Comments  Symptom Y/N Comments   Fever/Chills n   Chest Pain n    Poor Appetite n   Edema n    Cough n   Abdominal Pain n    Sputum n   Joint Pain     SOB/SALAS n   Pruritis/Rash     Nausea/vomit n   Tolerating PT/OT     Diarrhea n   Tolerating Diet     Constipation n   Other       Could NOT obtain due to:      Objective:     VITALS:   Last 24hrs VS reviewed since prior progress note.  Most recent are:  Patient Vitals for the past 24 hrs:   Temp Pulse Resp BP SpO2   22 1037 98 °F (36.7 °C) 79 21 107/62 97 %   22 0943     96 %   22 0745  70 24  (!) 88 %   22 0730 97.7 °F (36.5 °C) 61 17 102/66 95 %   22 0416     97 %   22 0400 98.6 °F (37 °C) 63 19 (!) 150/76 100 %   03/12/22 0000  (!) 54 18 127/68 100 %   03/11/22 2350 98.1 °F (36.7 °C) 60 17 117/69 99 %   03/11/22 2338     93 %   03/11/22 2017     99 %   03/11/22 2000 98.3 °F (36.8 °C) 63 11 125/76 100 %     No intake or output data in the 24 hours ending 03/12/22 1350     PHYSICAL EXAM:  General: WD, WN. Alert, cooperative, no acute distress    EENT:  EOMI. Anicteric sclerae. MMM  Resp:   No accessory muscle use  CV:  Regular  rhythm,  No edema  GI:  Soft, Non distended, Non tender.  +Bowel sounds  Neurologic:  Not anxious  Skin:  No rashes. No jaundice    Reviewed most current lab test results and cultures  YES  Reviewed most current radiology test results   YES  Review and summation of old records today    NO  Reviewed patient's current orders and MAR    YES  PMH/ reviewed - no change compared to H&P  ________________________________________________________________________  Care Plan discussed with:    Comments   Patient x    Family      RN x    Care Manager     Consultant                        Multidiciplinary team rounds were held today with , nursing, pharmacist and clinical coordinator. Patient's plan of care was discussed; medications were reviewed and discharge planning was addressed. ________________________________________________________________________  Total NON critical care TIME:  30   Minutes    Total CRITICAL CARE TIME Spent:   Minutes non procedure based      Comments   >50% of visit spent in counseling and coordination of care     ________________________________________________________________________  Mena Monteiro MD     Procedures: see electronic medical records for all procedures/Xrays and details which were not copied into this note but were reviewed prior to creation of Plan. LABS:  I reviewed today's most current labs and imaging studies.   Pertinent labs include:  Recent Labs     03/12/22  0409 03/11/22  0218 03/10/22  0443   WBC 11.6* 12.1* 9.4   HGB 12.6 13.0 12.3   HCT 36.6 38.9 36.4    367 348     Recent Labs     03/12/22  0409 03/11/22  0218 03/10/22  0443   * 134* 133*   K 4.3 4.8 4.6    102 102   CO2 32 29 26   GLU 63* 169* 182*   BUN 31* 30* 30*   CREA 0.58 0.58 0.61   CA 8.8 8.4* 8.4*       Signed: Sloane Alarcon MD

## 2022-03-12 NOTE — PROGRESS NOTES
Upon changing shift this a.m.  patient assessed at the bedside. Alert and oriented. Low BG noted. DeSat while talking, and en exertion but asymptomatic. Added NRBR to HHFL. SpO2 improved. D10 IV bolus and 2 tablets glucose given. BG improved. Encouraged patient to eat and drinks. Patient's family members at the bedside one by one for patient's support. Radha care performed. Repositioned patient as tolerated. Recurrent low BG this evening. 4 PO Tablets of glucose given and BG improved. Made aware Hospitalist and requested to lower coverage NPH.

## 2022-03-12 NOTE — PROGRESS NOTES
Pulmonary, Critical Care, and Sleep Medicine     Patient Consult    Name: Ron Romero MRN: [id number]   : 1950 Hospital: [facility]   Date: 3/12/2022        IMPRESSION:   · Acute Hypoxic Respiratory Failure, on high flow oxygen. Adjust to keep pox over 88%. Seems to be tolerating well. Seems to be improving. · Covid 19 Pneumonia, Severe multilobar infiltrates. Still on high level of oxygen. · Type 2 DM  · Hypertension  · Full Code. RECOMMENDATIONS:   · Continue current level of support, Wean oxygen to keep pox over 88%. · ON Decadron  · ON Olumiant  · ON Enoxaparin  · Glycemic control. · Tolerating regular diet. · Agree with palliative care assistance  · Discussed with nursing      Subjective:   3/12/22 : Resting in bed. Sister at bedside. No sig changes. CXR w/ unchanged evan asdz. 3-11-22: Pt has been feeling about the same but has worsening desaturation. Had to be placed on bipap this am. She denies any issues with aspiration. NO chest pain, no back pain. NO headache.     3-10-22: NO chest pain, no back pain. NO headaches. She feels like she is making progress. Sleeping well. NO fevers. 3-9-22: PT  Is alert and interactive this am. She is on high flow NC oxygen. NO chest pain, she has mild lower back pain, no leg pain, difficult to move in her bed. She feels that her breathing is comfortable. NO sinus pain or pressure. Has been tolerating the High flow NC. Tolerated eating her breakfast well this am.         This patient has been seen and evaluated at the request of Dr. Estiven Avendaño for above. Patient is a 66 y.o. female had difficulty with sleeping. Has a dry cough. NO headache. NO sinus pain or pressure. NO GERD. Lying in bed. She was ready to eat this am.   Feels fatigued.        Past Medical History:   Diagnosis Date    Diabetes ([address]     Hypercholesterolemia     Hypertension       Past Surgical History:   Procedure Laterality Date    HX GYN  HX HYSTERECTOMY N/A       Prior to Admission medications    Not on File     No Known Allergies   Social History     Tobacco Use    Smoking status: Former Smoker     Packs/day: 0.50     Years: 4.00     Pack years: 2.00     Quit date: 1975     Years since quittin.7    Smokeless tobacco: Never Used   Substance Use Topics    Alcohol use: No      Family History   Problem Relation Age of Onset    Cancer Mother     Diabetes Mother     Heart Disease Mother     Diabetes Sister     Diabetes Sister     Diabetes Sister     Diabetes Sister     Cancer Sister         Current Facility-Administered Medications   Medication Dose Route Frequency    balsam peru-castor oiL (VENELEX) ointment   Topical BID    insulin NPH (NOVOLIN N, HUMULIN N) injection 20 Units  20 Units SubCUTAneous ACB&D    dexAMETHasone (DECADRON) tablet 10 mg  10 mg Oral DAILY    baricitinib (OLUMIANT) tablet 4 mg  4 mg Oral DAILY    aspirin chewable tablet 81 mg  81 mg Oral DAILY    [Held by provider] lisinopriL (PRINIVIL, ZESTRIL) tablet 10 mg  10 mg Oral DAILY    [Held by provider] metoprolol succinate (TOPROL-XL) XL tablet 50 mg  50 mg Oral DAILY    atorvastatin (LIPITOR) tablet 10 mg  10 mg Oral DAILY    sodium chloride (NS) flush 5-40 mL  5-40 mL IntraVENous Q8H    enoxaparin (LOVENOX) injection 40 mg  40 mg SubCUTAneous DAILY    insulin lispro (HUMALOG) injection   SubCUTAneous AC&HS       Review of Systems:  Constitutional: positive for fatigue and malaise  Eyes: negative  Ears, nose, mouth, throat, and face: negative  Respiratory: positive for cough or dyspnea on exertion  Cardiovascular: negative  Gastrointestinal: negative  Genitourinary:negative  Integument/breast: negative  Hematologic/lymphatic: negative  Musculoskeletal:negative  Neurological: negative  Behavioral/Psych: negative  Endocrine: negative  Allergic/Immunologic: negative    Objective:   Vital Signs:    Visit Vitals  /66 (BP 1 Location: Right upper arm, BP Patient Position: At rest;Lying)   Pulse 70   Temp 97.7 °F (36.5 °C)   Resp 24   Ht 5' (1.524 m)   Wt 62.2 kg (137 lb 1.6 oz)   SpO2 (!) 88%   Breastfeeding No   BMI 26.78 kg/m²       O2 Device: Heated,Hi flow nasal cannula,Non-rebreather mask   O2 Flow Rate (L/min): 55 l/min   Temp (24hrs), Av.1 °F (36.7 °C), Min:97.7 °F (36.5 °C), Max:98.6 °F (37 °C)       Intake/Output:   Last shift:      No intake/output data recorded. Last 3 shifts: 03/10 1901 -  0700  In: -   Out: 250 [Urine:250]  No intake or output data in the 24 hours ending 22 0846   Physical Exam:   General:  Alert, cooperative, no distress, appears stated age. On high flow oxygen. Head:  Normocephalic, without obvious abnormality, atraumatic. Eyes:  Conjunctivae/corneas clear. PERRL, EOMs intact. Nose: Nares normal. Septum midline. Mucosa normal. No drainage or sinus tenderness. Throat: Lips, mucosa, and tongue normal. Teeth and gums normal.   Neck: Supple, symmetrical, trachea midline, no adenopathy, thyroid: no enlargment/tenderness/nodules, no carotid bruit and no JVD. Back:   Symmetric, no curvature. ROM normal.   Lungs:   Clear to auscultation bilaterally. Chest wall:  No tenderness or deformity. Heart:  Regular rate and rhythm, S1, S2 normal, no murmur, click, rub or gallop. Abdomen:   Soft, non-tender. Bowel sounds normal. No masses,  No organomegaly. Extremities: Extremities normal, atraumatic, no cyanosis or edema. Pulses: 2+ and symmetric all extremities. Skin: Skin color, texture, turgor normal. No rashes or lesions   Lymph nodes: Cervical, supraclavicular, and axillary nodes normal.   Neurologic: Grossly nonfocal, motor is intact.       Data review:     Recent Results (from the past 24 hour(s))   GLUCOSE, POC    Collection Time: 22 11:11 AM   Result Value Ref Range    Glucose (POC) 193 (H) 65 - 117 mg/dL    Performed by Camacho Penning PCT    GLUCOSE, POC    Collection Time: 22  3:59 PM Result Value Ref Range    Glucose (POC) 210 (H) 65 - 117 mg/dL    Performed by Mik    GLUCOSE, POC    Collection Time: 03/11/22  9:29 PM   Result Value Ref Range    Glucose (POC) 137 (H) 65 - 117 mg/dL    Performed by Edilson Gunderson (TRV RN)    CBC W/O DIFF    Collection Time: 03/12/22  4:09 AM   Result Value Ref Range    WBC 11.6 (H) 3.6 - 11.0 K/uL    RBC 4.40 3.80 - 5.20 M/uL    HGB 12.6 11.5 - 16.0 g/dL    HCT 36.6 35.0 - 47.0 %    MCV 83.2 80.0 - 99.0 FL    MCH 28.6 26.0 - 34.0 PG    MCHC 34.4 30.0 - 36.5 g/dL    RDW 12.2 11.5 - 14.5 %    PLATELET 184 243 - 023 K/uL    MPV 9.6 8.9 - 12.9 FL    NRBC 0.0 0  WBC    ABSOLUTE NRBC 0.00 0.00 - 8.04 K/uL   METABOLIC PANEL, BASIC    Collection Time: 03/12/22  4:09 AM   Result Value Ref Range    Sodium 134 (L) 136 - 145 mmol/L    Potassium 4.3 3.5 - 5.1 mmol/L    Chloride 102 97 - 108 mmol/L    CO2 32 21 - 32 mmol/L    Anion gap 0 (L) 5 - 15 mmol/L    Glucose 63 (L) 65 - 100 mg/dL    BUN 31 (H) 6 - 20 MG/DL    Creatinine 0.58 0.55 - 1.02 MG/DL    BUN/Creatinine ratio 53 (H) 12 - 20      GFR est AA >60 >60 ml/min/1.73m2    GFR est non-AA >60 >60 ml/min/1.73m2    Calcium 8.8 8.5 - 10.1 MG/DL   GLUCOSE, POC    Collection Time: 03/12/22  7:39 AM   Result Value Ref Range    Glucose (POC) 59 (L) 65 - 117 mg/dL    Performed by Dayan Goodwin PCT    GLUCOSE, POC    Collection Time: 03/12/22  8:18 AM   Result Value Ref Range    Glucose (POC) 276 (H) 65 - 117 mg/dL    Performed by Dayan Goodwin PCT        Imaging:  I have personally reviewed the patients radiographs and have reviewed the reports:  3-1-22: CT of chest: IMPRESSION  1. No evidence for pulmonary embolism. 2. Bilateral groundglass opacification may be due to edema, pneumonia, or  hemorrhage and clinical correlation is recommended.   3. Cholelithiasis.           Juan Ramon Rdz, NP

## 2022-03-13 NOTE — PROGRESS NOTES
[id number], positive's patient. Patient assessed at the bedside. Alert and oriented. Periodic confusion noted. Reoriented patient to time and situation. Tolerated HHFL. Had episode of DeSat while anxious, eating but recover gradually at rest. Anxiety medication given and effective. Patient's families at the bedside. BG monitored. Radha care performed. Lotion applied. NPH held.

## 2022-03-13 NOTE — PROGRESS NOTES
Hospitalist Progress Note    NAME: Johnny Saini   :  1950   MRN:  [id number]       Assessment / Plan:  Acute hypoxic respiratory failure due to  COVID-19 Pneumonia POA  Hypertension  -Coming with shortness of breath, cough and phlegm  -CT shows evidence of pulmonary edema versus infection  -Rapid COVID-19 test is positive, procal low.    -Patient is unsure if she is vaccinated  -Continue dexamethasone, baricitinib  -Procalcitonin level is only 0.1, repeat procalcitonin 0.07  -Appreciated pulmonary consult  -Continue to hold antihypertensive medications  -cont' to wean O2. Pt is still requiring 55LHF. Pt's goals are clear-NO MASK  -palliative team is following  Attempted to call Viri to give updates, but calls went to     Diabetes mellitus type 2  -Hemoglobin A1c is 11.9  -BG low requiring treatment. This is due to poor po intake. Will stop NPH and use SSI only. Adjust prn     Hypertension  Dyslipidemia  -Continue hold home lisinopril, metoprolol due to hypotension  -Continue Lipitor        Code Status:DNR  Surrogate Decision Maker: pt's friend  DVT Prophylaxis: Lovenox  GI Prophylaxis: not indicated     Baseline: From home, independent of ADLs    Recommended Disposition: Home w/Family     Subjective:     Chief Complaint / Reason for Physician Visit  Resting in bed, on HF. Poor appetite. Review of Systems:  Symptom Y/N Comments  Symptom Y/N Comments   Fever/Chills n   Chest Pain n    Poor Appetite n   Edema n    Cough n   Abdominal Pain n    Sputum n   Joint Pain     SOB/SALAS n   Pruritis/Rash     Nausea/vomit n   Tolerating PT/OT     Diarrhea n   Tolerating Diet     Constipation n   Other       Could NOT obtain due to:      Objective:     VITALS:   Last 24hrs VS reviewed since prior progress note.  Most recent are:  Patient Vitals for the past 24 hrs:   Temp Pulse Resp BP SpO2   22 1121     97 %   22 1110 97.7 °F (36.5 °C) 73 20 106/63 97 %   22 0752 98.4 °F (36.9 °C) 65 24 123/66 100 %   03/13/22 0735     94 %   03/13/22 0503     100 %   03/13/22 0402 98.2 °F (36.8 °C) 62 22 114/71 100 %   03/13/22 0152     100 %   03/12/22 2310 98 °F (36.7 °C) 62 19 108/63 99 %   03/12/22 2043     92 %   03/12/22 2001 98.2 °F (36.8 °C) 62 12 (!) 102/56 96 %   03/12/22 1702  74 20 96/66 92 %   03/12/22 1600 98.4 °F (36.9 °C) 71 21 (!) 98/58 93 %   03/12/22 1500     95 %       Intake/Output Summary (Last 24 hours) at 3/13/2022 1148  Last data filed at 3/13/2022 1953  Gross per 24 hour   Intake 500 ml   Output 460 ml   Net 40 ml        PHYSICAL EXAM:  General: Resting in bed, ill appearing  EENT:  EOMI. Anicteric sclerae. MMM  Resp:   No accessory muscle use  CV:  Regular  rhythm,  No edema  GI:  Soft, Non distended, Non tender.  +Bowel sounds  Neurologic:  Not anxious currently  Skin:  No rashes. No jaundice    Reviewed most current lab test results and cultures  YES  Reviewed most current radiology test results   YES  Review and summation of old records today    NO  Reviewed patient's current orders and MAR    YES  PMH/ reviewed - no change compared to H&P  ________________________________________________________________________  Care Plan discussed with:    Comments   Patient x    Family      RN x    Care Manager     Consultant                        Multidiciplinary team rounds were held today with , nursing, pharmacist and clinical coordinator. Patient's plan of care was discussed; medications were reviewed and discharge planning was addressed.      ________________________________________________________________________  Total NON critical care TIME:  30   Minutes    Total CRITICAL CARE TIME Spent:   Minutes non procedure based      Comments   >50% of visit spent in counseling and coordination of care     ________________________________________________________________________  Diandra Stuart MD     Procedures: see electronic medical records for all procedures/Xrays and details which were not copied into this note but were reviewed prior to creation of Plan. LABS:  I reviewed today's most current labs and imaging studies.   Pertinent labs include:  Recent Labs     03/13/22  0524 03/12/22  0409 03/11/22 0218   WBC 13.7* 11.6* 12.1*   HGB 12.2 12.6 13.0   HCT 36.7 36.6 38.9   * 396 367     Recent Labs     03/13/22 0524 03/12/22 0409 03/11/22 0218   * 134* 134*   K 4.7 4.3 4.8    102 102   CO2 27 32 29   GLU 55* 63* 169*   BUN 30* 31* 30*   CREA 0.57 0.58 0.58   CA 8.6 8.8 8.4*       Signed: Tiffany Cash MD

## 2022-03-14 NOTE — PROGRESS NOTES
Transition of Care Plan:     RUR:14%  Disposition:Home with family.   Follow up appointments:set up with  new PCP at [facility] for April 20th. She has paperwork that must be sent in by 4/ 14 or appointment will be cancelled. I have called her friend Viri and let her know this and she says she will take care of it and make sure paperwork is done.   DME needed: She has no DME at home.   Transportation at Discharge:her friend Alcira Ni or means to access home:   Viri     IM Medicare Letter:She will need second IM letter.   Is patient a BCPI-A Bundle:     N/A                 If yes, was Bundle Letter given?:    Is patient a [place] and connected with the [facility]? N/A               If yes, was [place] transfer form completed and [facility] notified? Caregiver Contact:friend Viri Bishop . Viri has looked out for her the past 30 years. Also her niece Garcia Frias .   Discharge Caregiver contacted prior to [address] needed?:  Not at this time        Patient continues to be monitored in the pcu. She is presently on high flow oxygen with NRB. Sarah Madison RN BSN CRM        [phone]

## 2022-03-14 NOTE — PROGRESS NOTES
Hospitalist Progress Note    NAME: Tamar Rodriguez   :  1950   MRN:  [id number]       Assessment / Plan:  Acute hypoxic respiratory failure due to  COVID-19 Pneumonia POA  Hypertension  -Coming with shortness of breath, cough and phlegm  -CT shows evidence of pulmonary edema versus infection  -Rapid COVID-19 test is positive, procal low.    -Patient is unsure if she is vaccinated  -Continue dexamethasone, baricitinib  -Procalcitonin level is only 0.1, repeat procalcitonin 0.07  -Appreciated pulmonary consult  -Continue to hold antihypertensive medications  -cont' to wean O2. Pt is still requiring 60 LHF. Pt's goals are clear-NO MASK  -palliative team is following  -Patient has shown no improvement in last 2 weeks    Diabetes mellitus type 2  -Hemoglobin A1c is 11.9  -BG low requiring treatment. This is due to poor po intake. NPH was stopped, continue SSI. Adjust prn     Hypertension  Dyslipidemia  -Continue hold home lisinopril, metoprolol due to hypotension  -Continue Lipitor        Code Status:DNR  Surrogate Decision Maker: pt's friend  DVT Prophylaxis: Lovenox  GI Prophylaxis: not indicated     Baseline: From home, independent of ADLs    Recommended Disposition: Home w/Family     Subjective:     Chief Complaint / Reason for Physician Visit  Patient is noted to be confused, she is on high flow with nonrebreather. Does not want BiPAP    Review of Systems:  Symptom Y/N Comments  Symptom Y/N Comments   Fever/Chills n   Chest Pain n    Poor Appetite n   Edema n    Cough n   Abdominal Pain n    Sputum n   Joint Pain     SOB/SALAS n   Pruritis/Rash     Nausea/vomit n   Tolerating PT/OT     Diarrhea n   Tolerating Diet     Constipation n   Other       Could NOT obtain due to:      Objective:     VITALS:   Last 24hrs VS reviewed since prior progress note.  Most recent are:  Patient Vitals for the past 24 hrs:   Temp Pulse Resp BP SpO2   22 1157     (!) 87 %   22 1023 97.3 °F (36.3 °C) 70 29 (!) 104/56 90 %   03/14/22 0802 97.8 °F (36.6 °C) 68 29 114/78 (!) 87 %   03/14/22 0757     (!) 88 %   03/14/22 0412     99 %   03/14/22 0400 97.9 °F (36.6 °C) (!) 59 19 114/70 91 %   03/14/22 0012     90 %   03/14/22 0000 98.2 °F (36.8 °C) 68 27 123/69 91 %   03/13/22 2340  67 21 106/72 90 %   03/13/22 2001 98.1 °F (36.7 °C) 86 29 114/68 (!) 86 %   03/13/22 1932     92 %   03/13/22 1543     92 %   03/13/22 1519 98.4 °F (36.9 °C) 78 22 105/65 91 %       Intake/Output Summary (Last 24 hours) at 3/14/2022 1323  Last data filed at 3/14/2022 1217  Gross per 24 hour   Intake 320 ml   Output 360 ml   Net -40 ml        PHYSICAL EXAM:  General: Resting in bed, ill appearing  EENT:  EOMI. Anicteric sclerae. MMM  Resp:   No accessory muscle use  CV:  Regular  rhythm,  No edema  GI:  Soft, Non distended, Non tender.  +Bowel sounds  Neurologic:  Not anxious currently  Skin:  No rashes. No jaundice    Reviewed most current lab test results and cultures  YES  Reviewed most current radiology test results   YES  Review and summation of old records today    NO  Reviewed patient's current orders and MAR    YES  PMH/ reviewed - no change compared to H&P  ________________________________________________________________________  Care Plan discussed with:    Comments   Patient x    Family      RN x    Care Manager     Consultant                       x Multidiciplinary team rounds were held today with , nursing, pharmacist and clinical coordinator. Patient's plan of care was discussed; medications were reviewed and discharge planning was addressed.      ________________________________________________________________________  Total NON critical care TIME:  30   Minutes    Total CRITICAL CARE TIME Spent:   Minutes non procedure based      Comments   >50% of visit spent in counseling and coordination of care     ________________________________________________________________________  Pranav Estevez MD Procedures: see electronic medical records for all procedures/Xrays and details which were not copied into this note but were reviewed prior to creation of Plan. LABS:  I reviewed today's most current labs and imaging studies.   Pertinent labs include:  Recent Labs     03/13/22 0524 03/12/22  0409   WBC 13.7* 11.6*   HGB 12.2 12.6   HCT 36.7 36.6   * 396     Recent Labs     03/13/22 0524 03/12/22  0409   * 134*   K 4.7 4.3    102   CO2 27 32   GLU 55* 63*   BUN 30* 31*   CREA 0.57 0.58   CA 8.6 8.8       Signed: Lyndsey David MD

## 2022-03-14 NOTE — PROGRESS NOTES
Patient is alert, oriented and able to make her needs known to staff. Ativan prn administered 0030 for agitation and anxiety with good effect. Currently on HHF but refuses NRB and BIPAP despite encouragements from staff members. Per patient, \"leave me alone\". Will continue to monitor.

## 2022-03-14 NOTE — PROGRESS NOTES
Pulmonary, Critical Care, and Sleep Medicine    Name: Ksenia Cifuentes MRN: [id number]   : 1950 Hospital: [facility]   Date: 3/14/2022        IMPRESSION:   · Acute Hypoxic Respiratory Failure  · ARDS  · Covid 19 Pneumonia   · Type 2 DM  · Hypertension      RECOMMENDATIONS:   · HFNC at maximal dosing - still hypoxic, but she is refusing any type of face mask (or intubation)  · On Decadron  · On Olumiant  · On Enoxaparin  · Glycemic control. · Tolerating regular diet. · Palliative care following  · Poor prognosis. High risk for further decline  · DNR  · Discussed with nursing      Subjective:     3-14-22: desaturating into the 80s on HFNC and occasionally NRB. Has refused NIV and frequently refuses NRB. She was sleeping on my evaluation and I did not wake her.  3/12/22 : Resting in bed. Sister at bedside. No sig changes. CXR w/ unchanged evan asdz. 3-11-22: Pt has been feeling about the same but has worsening desaturation. Had to be placed on bipap this am. She denies any issues with aspiration. NO chest pain, no back pain. NO headache.   3-10-22: NO chest pain, no back pain. NO headaches. She feels like she is making progress. Sleeping well. NO fevers. 3-9-22: PT  Is alert and interactive this am. She is on high flow NC oxygen. NO chest pain, she has mild lower back pain, no leg pain, difficult to move in her bed. She feels that her breathing is comfortable. NO sinus pain or pressure. Has been tolerating the High flow NC. Tolerated eating her breakfast well this am.         This patient has been seen and evaluated at the request of Dr. Henry Vargas for above. Patient is a 66 y.o. female had difficulty with sleeping. Has a dry cough. NO headache. NO sinus pain or pressure. NO GERD. Lying in bed. She was ready to eat this am.   Feels fatigued.        Past Medical History:   Diagnosis Date    Diabetes ([facility] [address]     Hypercholesterolemia     Hypertension       Past Surgical History:   Procedure Laterality Date    HX GYN      HX HYSTERECTOMY N/A       Prior to Admission medications    Not on File     No Known Allergies   Social History     Tobacco Use    Smoking status: Former Smoker     Packs/day: 0.50     Years: 4.00     Pack years: 2.00     Quit date: 1975     Years since quittin.7    Smokeless tobacco: Never Used   Substance Use Topics    Alcohol use: No      Family History   Problem Relation Age of Onset    Cancer Mother     Diabetes Mother     Heart Disease Mother     Diabetes Sister     Diabetes Sister     Diabetes Sister     Diabetes Sister     Cancer Sister         Current Facility-Administered Medications   Medication Dose Route Frequency    [Held by provider] insulin NPH (NOVOLIN N, HUMULIN N) injection 10 Units  10 Units SubCUTAneous ACB&D    balsam peru-castor oiL (VENELEX) ointment   Topical BID    dexAMETHasone (DECADRON) tablet 10 mg  10 mg Oral DAILY    baricitinib (OLUMIANT) tablet 4 mg  4 mg Oral DAILY    aspirin chewable tablet 81 mg  81 mg Oral DAILY    [Held by provider] lisinopriL (PRINIVIL, ZESTRIL) tablet 10 mg  10 mg Oral DAILY    [Held by provider] metoprolol succinate (TOPROL-XL) XL tablet 50 mg  50 mg Oral DAILY    atorvastatin (LIPITOR) tablet 10 mg  10 mg Oral DAILY    sodium chloride (NS) flush 5-40 mL  5-40 mL IntraVENous Q8H    enoxaparin (LOVENOX) injection 40 mg  40 mg SubCUTAneous DAILY    insulin lispro (HUMALOG) injection   SubCUTAneous AC&HS       Objective:   Vital Signs:    Visit Vitals  /78   Pulse 68   Temp 97.8 °F (36.6 °C)   Resp 29   Ht 5' (1.524 m)   Wt 62.2 kg (137 lb 1.6 oz)   SpO2 (!) 87%   Breastfeeding No   BMI 26.78 kg/m²       O2 Device: Hi flow nasal cannula,Non-rebreather mask   O2 Flow Rate (L/min): 60 l/min (Patient desated 84%-85%)   Temp (24hrs), Av °F (36.7 °C), Min:97.7 °F (36.5 °C), Max:98.4 °F (36.9 °C)       Intake/Output:   Last shift:      No intake/output data recorded.   Last 3 shifts: 03/12 1901 - 03/14 0700  In: 200 [P.O.:200]  Out: 820 [Urine:820]    Intake/Output Summary (Last 24 hours) at 3/14/2022 6068  Last data filed at 3/13/2022 1629  Gross per 24 hour   Intake    Output 360 ml   Net -360 ml      Physical Exam:   General:  Alert, ill appearing, on HFNC   Head:  Normocephalic, without obvious abnormality, atraumatic. Eyes:  Conjunctivae/corneas clear. Nose: Nares normal. Septum midline. Mucosa normal.     Throat: Lips, mucosa, and tongue normal. Teeth and gums normal.   Neck: Supple, symmetrical, trachea midline    Back:   Symmetric, no curvature. ROM normal.   Lungs:   Distant bilateral breath sounds   Chest wall:  No tenderness or deformity. Heart:  Regular rate and rhythm    Abdomen:   Soft, non-tender. Bowel sounds normal. No masses,  No organomegaly. Extremities: Extremities normal, atraumatic, no cyanosis or edema. Skin: Skin color, texture, turgor normal. No rashes or lesions   Neurologic: Grossly nonfocal       Data:   Labs:  Recent Labs     03/13/22  0524 03/12/22  0409   WBC 13.7* 11.6*   HGB 12.2 12.6   HCT 36.7 36.6   * 396     Recent Labs     03/13/22  0524 03/12/22  0409   * 134*   K 4.7 4.3    102   CO2 27 32   GLU 55* 63*   BUN 30* 31*   CREA 0.57 0.58   CA 8.6 8.8     No results for input(s): PHI, PCO2I, PO2I, HCO3I, FIO2I in the last 72 hours.     Imaging:  I have personally reviewed the patients radiographs:  None today        Pauline Hutchinson MD

## 2022-03-14 NOTE — PROGRESS NOTES
Palliative Medicine Consult  Yogesh: [phone] ([phone])    Patient Name: Sheree Cowden  YOB: 1950    Date of Initial Consult: 3/11/22  Reason for Consult: Care Decisions  Requesting Provider: Smith Lucia MD  Primary Care Physician: None     SUMMARY:   Sheree Cowden is a 66 y.o. with a past history of DM, HTN, HLD, who was admitted on 3/1/2022 from home with a diagnosis of acute hypoxic respiratory failure and suspected acute pulmonary edema. After admission she was tested for COVID and it resulted positive. She has been maintaining on high-flow around 50LPM at 70% FI02, but over the last 24 hours has been declining slowly. Today she really started to decompensate, and when I arrive she was so anxious and upset which was likely contributing to her low sats as well  Up until today she was able to eat and drink, although yesterday was less intake then the day before     PALLIATIVE DIAGNOSES:   1. Goals of care  2. Anorexia  3. Shortness of breath  4. Frailty  5. palliative     PLAN:   1. Talked with nursing this morning, Mrs Bryan is less responsive today, no longer consistently oriented. They have been trying to encourage her to wear the non-rebreather, but even in her calmness, she is still asking for it to be removed. 2. As she asked for no escalation past high-flow when she was completely oriented, we need to respect her wishes. I fully anticipate that she will become less responsive with her persistent hypoxia, but for now she is not uncomfortable so we can just hold the course. 3. If she becomes uncomfortable, can treat her symptoms as they come, and I will reach out to Viri to talk to her about making the transition fully to [address] [place] [address], but for now we will continue to treat, but no escalation of care. NO MASKS  4. I did talk to her friend Viri to let her know that Mrs Bryan is much more tired today, and that getting her home might not happen, but we are still working towards that.   She understands and tells me that although she is still hopeful, she is bringing her son in this evening to see him mom just in case. 5. She understands that we will treat symptoms as they arise, but that for now she is calm and comfortable  6. Initial consult note routed to primary continuity provider and/or primary health care team members  7. Communicated plan of care with: [facility]     GOALS OF CARE / TREATMENT PREFERENCES:     GOALS OF CARE:  Patient/Health Care Proxy Stated Goals: Comfort    TREATMENT PREFERENCES:   Code Status: DNR    Advance Care Planning:  [x] [facility] Interdisciplinary Team has updated the ACP Navigator with Health Care Decision Maker and Patient Capacity      Primary Decision Maker: Jennifer [place] - Other Non-relative - [phone]    Medical Interventions: Limited additional interventions       Other:    As far as possible, the palliative care team has discussed with patient / health care proxy about goals of care / treatment preferences for patient.      HISTORY:     History obtained from: chart, patient    CHIEF COMPLAINT: none    HPI/SUBJECTIVE:    The patient is:   [x] Verbal and participatory  [] Non-participatory due to:     Clinical Pain Assessment (nonverbal scale for severity on nonverbal patients):   Clinical Pain Assessment  Severity: 0          Duration: for how long has pt been experiencing pain (e.g., 2 days, 1 month, years)  Frequency: how often pain is an issue (e.g., several times per day, once every few days, constant)     FUNCTIONAL ASSESSMENT:     Palliative Performance Scale (PPS):  PPS: 20       PSYCHOSOCIAL/SPIRITUAL SCREENING:     Palliative IDT has assessed this patient for cultural preferences / practices and a referral made as appropriate to needs (Cultural Services, Patient Advocacy, Ethics, etc.)    Any spiritual / Mu-ism concerns:  [] Yes /  [x] No   If \"Yes\" to discuss with pastoral care during IDT     Does caregiver feel burdened by caring for their loved one:   [] Yes /  [x] No /  [] No Caregiver Present    If \"Yes\" to discuss with social work during IDT    Anticipatory grief assessment:   [x] Normal  / [] Maladaptive     If \"Maladaptive\" to discuss with social work during IDT    ESAS Anxiety: Anxiety: 8    ESAS Depression: Depression: 0        REVIEW OF SYSTEMS:     Positive and pertinent negative findings in ROS are noted above in HPI. The following systems were [x] reviewed / [] unable to be reviewed as noted in HPI  Other findings are noted below. Systems: constitutional, ears/nose/mouth/throat, respiratory, gastrointestinal, genitourinary, musculoskeletal, integumentary, neurologic, psychiatric, endocrine. Positive findings noted below. Modified ESAS Completed by: provider   Fatigue: 2     Depression: 0 Pain: 0   Anxiety: 8 Nausea: 0   Anorexia: 8 Dyspnea: 6           Stool Occurrence(s): 1        PHYSICAL EXAM:     From RN flowsheet:  Wt Readings from Last 3 Encounters:   03/10/22 137 lb 1.6 oz (62.2 kg)   02/23/22 148 lb 15.8 oz (67.6 kg)   07/14/20 150 lb (68 kg)     Blood pressure (!) 104/56, pulse 70, temperature 97.3 °F (36.3 °C), resp. rate 29, height 5' (1.524 m), weight 137 lb 1.6 oz (62.2 kg), SpO2 90 %, not currently breastfeeding.     Pain Scale 1: Numeric (0 - 10)  Pain Intensity 1: 0  Pain Onset 1: acute  Pain Location 1: Back  Pain Orientation 1: Mid  Pain Description 1: Aching  Pain Intervention(s) 1: Medication (see MAR)  Last bowel movement, if known:     Constitutional: flat, sleepy  Eyes: pupils equal, anicteric  ENMT: dried membranes, ervin in her nose  Cardiovascular: regular rhythm, distal pulses intact  Respiratory: breathing not labored, symmetric  Gastrointestinal: soft non-tender, +bowel sounds  Musculoskeletal: no deformity, no tenderness to palpation  Skin: warm, dry  Neurologic: following commands, moving all extremities  Psychiatric: flat affect, no hallucinations  Other:       HISTORY: Active Problems:    Acute pulmonary edema (HCC) (3/1/2022)      Goals of care, counseling/discussion ()      Anxiety ()      Shortness of breath ()      Frailty ()      Palliative care by specialist ()      Past Medical History:   Diagnosis Date    Diabetes ([address]     Hypercholesterolemia     Hypertension       Past Surgical History:   Procedure Laterality Date    HX GYN      HX HYSTERECTOMY N/A       Family History   Problem Relation Age of Onset    Cancer Mother     Diabetes Mother     Heart Disease Mother     Diabetes Sister     Diabetes Sister     Diabetes Sister     Diabetes Sister     Cancer Sister       History reviewed, no pertinent family history.   Social History     Tobacco Use    Smoking status: Former Smoker     Packs/day: 0.50     Years: 4.00     Pack years: 2.00     Quit date: 1975     Years since quittin.7    Smokeless tobacco: Never Used   Substance Use Topics    Alcohol use: No     No Known Allergies   Current Facility-Administered Medications   Medication Dose Route Frequency    [Held by provider] insulin NPH (NOVOLIN N, HUMULIN N) injection 10 Units  10 Units SubCUTAneous ACB&D    LORazepam (ATIVAN) injection 1 mg  1 mg IntraVENous TID PRN    balsam peru-castor oiL (VENELEX) ointment   Topical BID    sodium chloride (OCEAN) 0.65 % nasal squeeze bottle 2 Spray  2 Spray Both Nostrils Q2H PRN    dexAMETHasone (DECADRON) tablet 10 mg  10 mg Oral DAILY    ipratropium-albuterol (COMBIVENT RESPIMAT) 20 mcg-100 mcg inhalation spray  1 Puff Inhalation Q6H PRN    albuterol (PROVENTIL HFA, VENTOLIN HFA, PROAIR HFA) inhaler 2 Puff  2 Puff Inhalation Q4H PRN    baricitinib (OLUMIANT) tablet 4 mg  4 mg Oral DAILY    aspirin chewable tablet 81 mg  81 mg Oral DAILY    [Held by provider] lisinopriL (PRINIVIL, ZESTRIL) tablet 10 mg  10 mg Oral DAILY    [Held by provider] metoprolol succinate (TOPROL-XL) XL tablet 50 mg  50 mg Oral DAILY    atorvastatin (LIPITOR) tablet 10 mg 10 mg Oral DAILY    sodium chloride (NS) flush 5-40 mL  5-40 mL IntraVENous Q8H    sodium chloride (NS) flush 5-40 mL  5-40 mL IntraVENous PRN    acetaminophen (TYLENOL) tablet 650 mg  650 mg Oral Q6H PRN    Or    acetaminophen (TYLENOL) suppository 650 mg  650 mg Rectal Q6H PRN    polyethylene glycol (MIRALAX) packet 17 g  17 g Oral DAILY PRN    ondansetron (ZOFRAN ODT) tablet 4 mg  4 mg Oral Q8H PRN    Or    ondansetron (ZOFRAN) injection 4 mg  4 mg IntraVENous Q6H PRN    enoxaparin (LOVENOX) injection 40 mg  40 mg SubCUTAneous DAILY    insulin lispro (HUMALOG) injection   SubCUTAneous AC&HS    glucose chewable tablet 16 g  4 Tablet Oral PRN    glucagon (GLUCAGEN) injection 1 mg  1 mg IntraMUSCular PRN    dextrose 10 % infusion 0-250 mL  0-250 mL IntraVENous PRN          LAB AND IMAGING FINDINGS:     Lab Results   Component Value Date/Time    WBC 13.7 (H) 03/13/2022 05:24 AM    HGB 12.2 03/13/2022 05:24 AM    PLATELET 184 (H) 44/51/8988 05:24 AM     Lab Results   Component Value Date/Time    Sodium 135 (L) 03/13/2022 05:24 AM    Potassium 4.7 03/13/2022 05:24 AM    Chloride 102 03/13/2022 05:24 AM    CO2 27 03/13/2022 05:24 AM    BUN 30 (H) 03/13/2022 05:24 AM    Creatinine 0.57 03/13/2022 05:24 AM    Calcium 8.6 03/13/2022 05:24 AM    Magnesium 1.8 08/14/2009 04:15 AM      Lab Results   Component Value Date/Time    Alk.  phosphatase 120 (H) 03/01/2022 02:20 PM    Protein, total 7.6 03/01/2022 02:20 PM    Albumin 2.6 (L) 03/01/2022 02:20 PM    Globulin 5.0 (H) 03/01/2022 02:20 PM     No results found for: INR, PTMR, PTP, PT1, PT2, APTT, INREXT, INREXT   No results found for: IRON, FE, TIBC, IBCT, PSAT, FERR   No results found for: PH, PCO2, PO2  No components found for: [place]   Lab Results   Component Value Date/Time    CK 41 08/12/2009 05:15 PM    CK - MB 1.0 08/12/2009 05:15 PM                Total time: 35 m  Counseling / coordination time, spent as noted above: 30m  > 50% counseling / coordination?: y    Prolonged service was provided for  []30 min   []75 min in face to face time in the presence of the patient, spent as noted above. Time Start:   Time End:   Note: this can only be billed with 27214 (initial) or 17718 (follow up). If multiple start / stop times, list each separately.

## 2022-03-15 NOTE — PROGRESS NOTES
03/15/22 1436   Vitals   Temp 97.2 °F (36.2 °C)   Temp Source Axillary   Pulse (Heart Rate) 76   Heart Rate Source Monitor   Resp Rate (!) 34   O2 Sat (%) (!) 83 %   Level of Consciousness Alert (0)   BP 90/62   MAP (Calculated) 71   BP 1 Location Right upper arm   BP 1 Method Automatic   BP Patient Position At rest   Cardiac Rhythm Sinus Rhythm   MEWS Score 4   Pain 1   Pain Scale 1 Numeric (0 - 10)   Pain Intensity 1 0   POSS Scale   Opioid Sedation Scale 1   Oxygen Therapy   O2 Device Heated; Hi flow nasal cannula   O2 Flow Rate (L/min) 60 l/min   FIO2 (%) 100 %   MEWS elevated d/t tachypnea and hypoxia. MD aware. Pt resting comfortably and in no distress.

## 2022-03-15 NOTE — PROGRESS NOTES
Hospitalist Progress Note    NAME: Librado Osborn   :  1950   MRN:  [id number]       Assessment / Plan:  Acute hypoxic respiratory failure due to  COVID-19 Pneumonia POA  Hypertension  -Coming with shortness of breath, cough and phlegm  -CT shows evidence of pulmonary edema versus infection  -Rapid COVID-19 test is positive, procal low.    -Patient is unsure if she is vaccinated  -Continue dexamethasone, baricitinib  -Procalcitonin level is only 0.1, repeat procalcitonin 0.07  -Appreciated pulmonary consult  -Continue to hold antihypertensive medications  -cont' to wean O2. Pt is still requiring 60 LHF. Pt's goals are clear-NO MASK  -She is hypoxic on 60 L with oxygen saturation in 70s  -Appreciated palliative care consult, continue current mode of treatment with no escalation per patient's request  -palliative team is following  -Patient has shown no improvement in last 2 weeks    Diabetes mellitus type 2  -Hemoglobin A1c is 11.9  -BG low requiring treatment. This is due to poor po intake. NPH was stopped, continue SSI. Adjust prn     Hypertension  Dyslipidemia  -Continue hold home lisinopril, metoprolol due to hypotension  -Continue Lipitor        Code Status:DNR with no escalation of current level of treatment  Surrogate Decision Maker: pt's friend  DVT Prophylaxis: Lovenox  GI Prophylaxis: not indicated     Baseline: From home, independent of ADLs    Recommended Disposition: Home w/Family     Subjective:     Chief Complaint / Reason for Physician Visit  Patient is hypoxic on 6 L. She is refusing to use BiPAP or intubation.     Review of Systems:  Symptom Y/N Comments  Symptom Y/N Comments   Fever/Chills n   Chest Pain n    Poor Appetite n   Edema n    Cough n   Abdominal Pain n    Sputum n   Joint Pain     SOB/SALAS n   Pruritis/Rash     Nausea/vomit n   Tolerating PT/OT     Diarrhea n   Tolerating Diet     Constipation n   Other       Could NOT obtain due to:      Objective:     VITALS:   Last 24hrs VS reviewed since prior progress note. Most recent are:  Patient Vitals for the past 24 hrs:   Temp Pulse Resp BP SpO2   03/15/22 1138     (!) 63 %   03/15/22 1100 97.9 °F (36.6 °C) (!) 103 (!) 35 (!) 164/82 (!) 43 %   03/15/22 0847     (!) 74 %   03/15/22 0816 97.6 °F (36.4 °C) 78 22 114/75 (!) 68 %   03/15/22 0731     (!) 81 %   03/15/22 0425     (!) 71 %   03/15/22 0401 97.7 °F (36.5 °C) 80 25 121/73 (!) 80 %   03/15/22 0003     (!) 79 %   03/14/22 2238 97.9 °F (36.6 °C) (!) 108 30 (!) 106/58 (!) 76 %   03/14/22 1942 97.5 °F (36.4 °C) 94 23 119/68 (!) 81 %   03/14/22 1919     (!) 79 %   03/14/22 1847  92 (!) 37 114/62 (!) 75 %   03/14/22 1515     (!) 81 %       Intake/Output Summary (Last 24 hours) at 3/15/2022 1225  Last data filed at 3/15/2022 0401  Gross per 24 hour   Intake    Output 320 ml   Net -320 ml        PHYSICAL EXAM:  General: Resting in bed, ill appearing  EENT:  EOMI. Anicteric sclerae. MMM  Resp:   No accessory muscle use  CV:  Regular  rhythm,  No edema  GI:  Soft, Non distended, Non tender.  +Bowel sounds  Neurologic:  Not anxious currently  Skin:  No rashes. No jaundice    Reviewed most current lab test results and cultures  YES  Reviewed most current radiology test results   YES  Review and summation of old records today    NO  Reviewed patient's current orders and MAR    YES  PMH/SH reviewed - no change compared to H&P  ________________________________________________________________________  Care Plan discussed with:    Comments   Patient x    Family      RN x    Care Manager     Consultant                       x Multidiciplinary team rounds were held today with , nursing, pharmacist and clinical coordinator. Patient's plan of care was discussed; medications were reviewed and discharge planning was addressed.      ________________________________________________________________________  Total NON critical care TIME:  30   Minutes    Total CRITICAL CARE TIME Spent:   Minutes non procedure based      Comments   >50% of visit spent in counseling and coordination of care     ________________________________________________________________________  Mateusz Khoury MD     Procedures: see electronic medical records for all procedures/Xrays and details which were not copied into this note but were reviewed prior to creation of Plan. LABS:  I reviewed today's most current labs and imaging studies.   Pertinent labs include:  Recent Labs     03/13/22 0524   WBC 13.7*   HGB 12.2   HCT 36.7   *     Recent Labs     03/13/22 0524   *   K 4.7      CO2 27   GLU 55*   BUN 30*   CREA 0.57   CA 8.6       Signed: Mateusz Khoury MD

## 2022-03-15 NOTE — PROGRESS NOTES
03/15/22 1100   Vitals   Temp 97.9 °F (36.6 °C)   Temp Source Axillary   Pulse (Heart Rate) (!) 103   Heart Rate Source Monitor   Resp Rate (!) 35   O2 Sat (%) (!) 43 %   Level of Consciousness Alert (0)   BP (!) 164/82   MAP (Calculated) 109   BP 1 Location Right upper arm   BP 1 Method Automatic   BP Patient Position At rest   Cardiac Rhythm Sinus Tachy   MEWS Score 4   Pain 1   Pain Scale 1 Numeric (0 - 10)   Pain Intensity 1 0   POSS Scale   Opioid Sedation Scale 1   Oxygen Therapy   O2 Device Heated; Hi flow nasal cannula   O2 Flow Rate (L/min) 60 l/min   FIO2 (%) 100 %   MEWS elevated d/t tachypnea, hypoxia, and tachycardia. MD aware. See progress note.

## 2022-03-15 NOTE — PROGRESS NOTES
Received notification from bedside RN about patient with regards to: , needs Lispro coverage order    Intervention given: Lispro 9 units SQ x 1 dose ordered

## 2022-03-15 NOTE — PROGRESS NOTES
0700- Report given to Antonio Weber, GEORGES by off going nurse. 1100- Pt restless and tachypneic. RR 30's. SATS 40's. Pt agreed to dose of Ativan. See MAR. Pt still declining any masks. 1130- Pt resting comfortably. SATS 70's.     1900- Report given to oncoming nurse by Antonio Weber RN.

## 2022-03-15 NOTE — PROGRESS NOTES
Pulmonary, Critical Care, and Sleep Medicine    Name: Sheree Cowden MRN: [id number]   : 1950 Hospital: [facility]   Date: 3/15/2022        IMPRESSION:   · Acute Hypoxic Respiratory Failure  · ARDS  · Covid 19 Pneumonia   · Type 2 DM  · Hypertension      RECOMMENDATIONS:   · HFNC at maximal dosing - still hypoxic, but she is refusing any type of face mask (or intubation)  · On Decadron  · On Olumiant  · On Enoxaparin  · Glycemic control. · Palliative care following  · Poor prognosis. High risk for further decline  · DNR  · Discussed with nursing      Subjective:     3-15-22: saturation ranging from high 60s to 80% on HFNC. Refusing masks and has made it clear that is her desire (no BiPAP, NRB, etc). Lethargic at times. 3-14-22: desaturating into the 80s on HFNC and occasionally NRB. Has refused NIV and frequently refuses NRB. She was sleeping on my evaluation and I did not wake her.  3/12/22 : Resting in bed. Sister at bedside. No sig changes. CXR w/ unchanged evan asdz. 3-11-22: Pt has been feeling about the same but has worsening desaturation. Had to be placed on bipap this am. She denies any issues with aspiration. NO chest pain, no back pain. NO headache.   3-10-22: NO chest pain, no back pain. NO headaches. She feels like she is making progress. Sleeping well. NO fevers. 3-9-22: PT  Is alert and interactive this am. She is on high flow NC oxygen. NO chest pain, she has mild lower back pain, no leg pain, difficult to move in her bed. She feels that her breathing is comfortable. NO sinus pain or pressure. Has been tolerating the High flow NC. Tolerated eating her breakfast well this am.         This patient has been seen and evaluated at the request of Dr. Akanksha Cuevas for above. Patient is a 66 y.o. female had difficulty with sleeping. Has a dry cough. NO headache. NO sinus pain or pressure. NO GERD. Lying in bed. She was ready to eat this am.   Feels fatigued. Past Medical History:   Diagnosis Date    Diabetes [address] [address]     Hypercholesterolemia     Hypertension       Past Surgical History:   Procedure Laterality Date    HX GYN      HX HYSTERECTOMY N/A       Prior to Admission medications    Not on File     No Known Allergies   Social History     Tobacco Use    Smoking status: Former Smoker     Packs/day: 0.50     Years: 4.00     Pack years: 2.00     Quit date: 1975     Years since quittin.7    Smokeless tobacco: Never Used   Substance Use Topics    Alcohol use: No      Family History   Problem Relation Age of Onset    Cancer Mother     Diabetes Mother     Heart Disease Mother     Diabetes Sister     Diabetes Sister     Diabetes Sister     Diabetes Sister     Cancer Sister         Current Facility-Administered Medications   Medication Dose Route Frequency    [Held by provider] insulin NPH (NOVOLIN N, HUMULIN N) injection 10 Units  10 Units SubCUTAneous ACB&D    balsam peru-castor oiL (VENELEX) ointment   Topical BID    aspirin chewable tablet 81 mg  81 mg Oral DAILY    [Held by provider] lisinopriL (PRINIVIL, ZESTRIL) tablet 10 mg  10 mg Oral DAILY    [Held by provider] metoprolol succinate (TOPROL-XL) XL tablet 50 mg  50 mg Oral DAILY    atorvastatin (LIPITOR) tablet 10 mg  10 mg Oral DAILY    sodium chloride (NS) flush 5-40 mL  5-40 mL IntraVENous Q8H    enoxaparin (LOVENOX) injection 40 mg  40 mg SubCUTAneous DAILY    insulin lispro (HUMALOG) injection   SubCUTAneous AC&HS       Objective:   Vital Signs:    Visit Vitals  /75 (BP 1 Location: Right upper arm, BP Patient Position: At rest)   Pulse 78   Temp 97.6 °F (36.4 °C)   Resp 22   Ht 5' (1.524 m)   Wt 62.2 kg (137 lb 1.6 oz)   SpO2 (!) 74%   Breastfeeding No   BMI 26.78 kg/m²       O2 Device: Heated,Hi flow nasal cannula   O2 Flow Rate (L/min): 60 l/min   Temp (24hrs), Av.6 °F (36.4 °C), Min:97.3 °F (36.3 °C), Max:97.9 °F (36.6 °C)       Intake/Output:   Last shift: No intake/output data recorded. Last 3 shifts: 03/13 1901 - 03/15 0700  In: 320 [P.O.:320]  Out: 320 [Urine:320]    Intake/Output Summary (Last 24 hours) at 3/15/2022 0908  Last data filed at 3/15/2022 0401  Gross per 24 hour   Intake 320 ml   Output 320 ml   Net 0 ml      Physical Exam:   General:  Ill appearing, on HFNC   Head:  Normocephalic, without obvious abnormality, atraumatic. Eyes:  Conjunctivae/corneas clear. Nose: Nares normal. Septum midline. Mucosa normal.     Throat: Lips, mucosa, and tongue normal.    Neck: Supple, symmetrical, trachea midline    Back:   Symmetric, no curvature. ROM normal.   Lungs:   Distant bilateral breath sounds   Chest wall:  No tenderness or deformity. Heart:  Regular rate and rhythm    Abdomen:   Soft, non-tender. Bowel sounds normal.    Extremities: Extremities normal, atraumatic    Skin: Skin color, texture, turgor normal. No rashes or lesions   Neurologic: Grossly nonfocal       Data:   Labs:  Recent Labs     03/13/22  0524   WBC 13.7*   HGB 12.2   HCT 36.7   *     Recent Labs     03/13/22  0524   *   K 4.7      CO2 27   GLU 55*   BUN 30*   CREA 0.57   CA 8.6     No results for input(s): PHI, PCO2I, PO2I, HCO3I, FIO2I in the last 72 hours.     Imaging:  I have personally reviewed the patients radiographs:  None today        Harjinder Ruelas MD

## 2022-03-15 NOTE — PROGRESS NOTES
Problem: Falls - Risk of  Goal: *Absence of Falls  Description: Document Sanjeev Colemaner Fall Risk and appropriate interventions in the flowsheet.   Outcome: Progressing Towards Goal  Note: Fall Risk Interventions:  Mobility Interventions: (P) Bed/chair exit alarm,Communicate number of staff needed for ambulation/transfer    Mentation Interventions: (P) Bed/chair exit alarm,Door open when patient unattended    Medication Interventions: (P) Bed/chair exit alarm,Evaluate medications/consider consulting pharmacy    Elimination Interventions: (P) Bed/chair exit alarm,Call light in reach    History of Falls Interventions: (P) Bed/chair exit alarm,Consult care management for discharge planning

## 2022-03-15 NOTE — PROGRESS NOTES
End of Shift Note    Bedside shift change report given to Warden Rajwinder RN (oncoming nurse) by Gavino Lara (offgoing nurse). Report included the following information SBAR, Kardex, Intake/Output, MAR and Recent Results    Shift worked:  7p-7a     Shift summary and any significant changes:     pt desats with any movement and takes a while to recover     Concerns for physician to address:    Zone phone for oncoming shift:          Activity:  Activity Level: Bed Rest  Number times ambulated in hallways past shift: 0  Number of times OOB to chair past shift: 0    Cardiac:   Cardiac Monitoring: Yes      Cardiac Rhythm: Sinus Rhythm    Access:   Current line(s): PIV     Genitourinary:   Urinary status: voiding and incontinent    Respiratory:   O2 Device: Heated,Hi flow nasal cannula  Chronic home O2 use?: NO  Incentive spirometer at bedside: NO       GI:  Last Bowel Movement Date: 03/14/22  Current diet:  DIET ONE TIME MESSAGE  ADULT ORAL NUTRITION SUPPLEMENT Breakfast, Dinner; Renal Supplement  ADULT DIET Easy to Chew; 4 carb choices (60 gm/meal); Chop all meats to make it easier to chew;any flavor Nepro shake  Passing flatus: YES  Tolerating current diet: YES       Pain Management:   Patient states pain is manageable on current regimen: YES    Skin:  Mike Score: 15  Interventions: turn team, speciality bed, float heels and increase time out of bed    Patient Safety:  Fall Score:  Total Score: (P) 3  Interventions: bed/chair alarm, assistive device (walker, cane, etc), gripper socks and pt to call before getting OOB  High Fall Risk: (P) Yes    Length of Stay:  Expected LOS: 5d 9h  Actual LOS: [address]

## 2022-03-15 NOTE — PROGRESS NOTES
Palliative Medicine Consult  Yogesh: [phone] ([phone])    Patient Name: Fifi Jo  YOB: 1950    Date of Initial Consult: 3/11/22  Reason for Consult: Care Decisions  Requesting Provider: Muriel Haywood MD  Primary Care Physician: None     SUMMARY:   Fifi Jo is a 66 y.o. with a past history of DM, HTN, HLD, who was admitted on 3/1/2022 from home with a diagnosis of acute hypoxic respiratory failure and suspected acute pulmonary edema. After admission she was tested for COVID and it resulted positive. She has been maintaining on high-flow around 50LPM at 70% FI02, but over the last 24 hours has been declining slowly. Today she really started to decompensate, and when I arrive she was so anxious and upset which was likely contributing to her low sats as well  Up until today she was able to eat and drink, although yesterday was less intake then the day before     PALLIATIVE DIAGNOSES:   1. Goals of care  2. Anorexia  3. Shortness of breath  4. Frailty  5. palliative     PLAN:   1. Mrs Donita Finley looks comfortable, oxygen levels remain in the 60's-70's but she is comfortable, ate some breakfast, no labored breathing. 2. Will continue to follow along for support  3. Initial consult note routed to primary continuity provider and/or primary health care team members  4.  Communicated plan of care with: Palliative Francisco [facility] DRAPER Team     GOALS OF CARE / TREATMENT PREFERENCES:     GOALS OF CARE:  Patient/Health Care Proxy Stated Goals: Cure    TREATMENT PREFERENCES:   Code Status: DNR    Advance Care Planning:  [x] [facility] Interdisciplinary Team has updated the ACP Navigator with [address] and Patient Capacity      Primary Decision Maker (Active): Chrystal Dejesus - Other Non-relative - [phone]    Medical Interventions: Limited additional interventions (No escalation of care)       Other:    As far as possible, the palliative care team has discussed with patient / health care proxy about goals of care / treatment preferences for patient. HISTORY:     History obtained from: chart, patient    CHIEF COMPLAINT: none    HPI/SUBJECTIVE:    The patient is:   [x] Verbal and participatory  [] Non-participatory due to:     Clinical Pain Assessment (nonverbal scale for severity on nonverbal patients):   Clinical Pain Assessment  Severity: 0          Duration: for how long has pt been experiencing pain (e.g., 2 days, 1 month, years)  Frequency: how often pain is an issue (e.g., several times per day, once every few days, constant)     FUNCTIONAL ASSESSMENT:     Palliative Performance Scale (PPS):  PPS: 30       PSYCHOSOCIAL/SPIRITUAL SCREENING:     Palliative IDT has assessed this patient for cultural preferences / practices and a referral made as appropriate to needs (Cultural Services, Patient Advocacy, Ethics, etc.)    Any spiritual / Mandaen concerns:  [] Yes /  [x] No   If \"Yes\" to discuss with pastoral care during IDT     Does caregiver feel burdened by caring for their loved one:   [] Yes /  [x] No /  [] No Caregiver Present    If \"Yes\" to discuss with social work during IDT    Anticipatory grief assessment:   [x] Normal  / [] Maladaptive     If \"Maladaptive\" to discuss with social work during IDT    ESAS Anxiety: Anxiety: 0    ESAS Depression: Depression: 0        REVIEW OF SYSTEMS:     Positive and pertinent negative findings in ROS are noted above in HPI. The following systems were [x] reviewed / [] unable to be reviewed as noted in HPI  Other findings are noted below. Systems: constitutional, ears/nose/mouth/throat, respiratory, gastrointestinal, genitourinary, musculoskeletal, integumentary, neurologic, psychiatric, endocrine. Positive findings noted below.   Modified ESAS Completed by: provider   Fatigue: 0     Depression: 0 Pain: 0   Anxiety: 0 Nausea: 0   Anorexia: 8 Dyspnea: 0           Stool Occurrence(s): 1        PHYSICAL EXAM:     From RN flowsheet:  Wt Readings from Last 3 Encounters:   03/10/22 137 lb 1.6 oz (62.2 kg)   22 148 lb 15.8 oz (67.6 kg)   20 150 lb (68 kg)     Blood pressure (!) 164/82, pulse (!) 103, temperature 97.9 °F (36.6 °C), resp. rate (!) 35, height 5' (1.524 m), weight 137 lb 1.6 oz (62.2 kg), SpO2 (!) 63 %, not currently breastfeeding. Pain Scale 1: Numeric (0 - 10)  Pain Intensity 1: 0  Pain Onset 1: acute  Pain Location 1: Back  Pain Orientation 1: Mid  Pain Description 1: Aching  Pain Intervention(s) 1: Medication (see MAR)  Last bowel movement, if known:     Constitutional: flat, sleepy  Eyes: pupils equal, anicteric  ENMT: dried membranes, ervin in her nose  Cardiovascular: regular rhythm, distal pulses intact  Respiratory: breathing not labored, symmetric  Gastrointestinal: soft non-tender, +bowel sounds  Musculoskeletal: no deformity, no tenderness to palpation  Skin: warm, dry  Neurologic: following commands, moving all extremities  Psychiatric: flat affect, no hallucinations  Other:       HISTORY:     Active Problems:    Acute pulmonary edema (HCC) (3/1/2022)      Goals of care, counseling/discussion ()      Anxiety ()      Shortness of breath ()      Frailty ()      Palliative care by specialist ()      Anorexia ()      Past Medical History:   Diagnosis Date    Diabetes ([facility] [address]     Hypercholesterolemia     Hypertension       Past Surgical History:   Procedure Laterality Date    HX GYN      HX HYSTERECTOMY N/A       Family History   Problem Relation Age of Onset    Cancer Mother     Diabetes Mother     Heart Disease Mother     Diabetes Sister     Diabetes Sister     Diabetes Sister     Diabetes Sister     Cancer Sister       History reviewed, no pertinent family history. Social History     Tobacco Use    Smoking status: Former Smoker     Packs/day: 0.50     Years: 4.00     Pack years: 2.00     Quit date: 1975     Years since quittin.7    Smokeless tobacco: Never Used   Substance Use Topics    Alcohol use:  No No Known Allergies   Current Facility-Administered Medications   Medication Dose Route Frequency    [Held by provider] insulin NPH (NOVOLIN N, HUMULIN N) injection 10 Units  10 Units SubCUTAneous ACB&D    LORazepam (ATIVAN) injection 1 mg  1 mg IntraVENous TID PRN    balsam peru-castor oiL (VENELEX) ointment   Topical BID    sodium chloride (OCEAN) 0.65 % nasal squeeze bottle 2 Spray  2 Spray Both Nostrils Q2H PRN    ipratropium-albuterol (COMBIVENT RESPIMAT) 20 mcg-100 mcg inhalation spray  1 Puff Inhalation Q6H PRN    albuterol (PROVENTIL HFA, VENTOLIN HFA, PROAIR HFA) inhaler 2 Puff  2 Puff Inhalation Q4H PRN    aspirin chewable tablet 81 mg  81 mg Oral DAILY    [Held by provider] lisinopriL (PRINIVIL, ZESTRIL) tablet 10 mg  10 mg Oral DAILY    [Held by provider] metoprolol succinate (TOPROL-XL) XL tablet 50 mg  50 mg Oral DAILY    atorvastatin (LIPITOR) tablet 10 mg  10 mg Oral DAILY    sodium chloride (NS) flush 5-40 mL  5-40 mL IntraVENous Q8H    sodium chloride (NS) flush 5-40 mL  5-40 mL IntraVENous PRN    acetaminophen (TYLENOL) tablet 650 mg  650 mg Oral Q6H PRN    Or    acetaminophen (TYLENOL) suppository 650 mg  650 mg Rectal Q6H PRN    polyethylene glycol (MIRALAX) packet 17 g  17 g Oral DAILY PRN    ondansetron (ZOFRAN ODT) tablet 4 mg  4 mg Oral Q8H PRN    Or    ondansetron (ZOFRAN) injection 4 mg  4 mg IntraVENous Q6H PRN    enoxaparin (LOVENOX) injection 40 mg  40 mg SubCUTAneous DAILY    insulin lispro (HUMALOG) injection   SubCUTAneous AC&HS    glucose chewable tablet 16 g  4 Tablet Oral PRN    glucagon (GLUCAGEN) injection 1 mg  1 mg IntraMUSCular PRN    dextrose 10 % infusion 0-250 mL  0-250 mL IntraVENous PRN          LAB AND IMAGING FINDINGS:     Lab Results   Component Value Date/Time    WBC 13.7 (H) 03/13/2022 05:24 AM    HGB 12.2 03/13/2022 05:24 AM    PLATELET 515 (H) 34/56/6556 05:24 AM     Lab Results   Component Value Date/Time    Sodium 135 (L) 03/13/2022 05:24 AM    Potassium 4.7 03/13/2022 05:24 AM    Chloride 102 03/13/2022 05:24 AM    CO2 27 03/13/2022 05:24 AM    BUN 30 (H) 03/13/2022 05:24 AM    Creatinine 0.57 03/13/2022 05:24 AM    Calcium 8.6 03/13/2022 05:24 AM    Magnesium 1.8 08/14/2009 04:15 AM      Lab Results   Component Value Date/Time    Alk. phosphatase 120 (H) 03/01/2022 02:20 PM    Protein, total 7.6 03/01/2022 02:20 PM    Albumin 2.6 (L) 03/01/2022 02:20 PM    Globulin 5.0 (H) 03/01/2022 02:20 PM     No results found for: INR, PTMR, PTP, PT1, PT2, APTT, INREXT, INREXT   No results found for: IRON, FE, TIBC, IBCT, PSAT, FERR   No results found for: PH, PCO2, PO2  No components found for: [place]   Lab Results   Component Value Date/Time    CK 41 08/12/2009 05:15 PM    CK - MB 1.0 08/12/2009 05:15 PM                Total time: 35m  Counseling / coordination time, spent as noted above: 30m  > 50% counseling / coordination?: y    Prolonged service was provided for  []30 min   []75 min in face to face time in the presence of the patient, spent as noted above. Time Start:   Time End:   Note: this can only be billed with 98640 (initial) or 71705 (follow up). If multiple start / stop times, list each separately.

## 2022-03-16 NOTE — PROGRESS NOTES
2235 Pt has started to become restless, tachypnic and sats dropped to the 40's. Pt agreed to receiving ativan. 0255 Pt started to desat with exertion to the low 30's while on HHF. Pt agrees to be placed on NRB over HHF for a while. End of Shift Note    Bedside shift change report given to Anna King RN (oncoming nurse) by Sandro Snell (offgoing nurse). Report included the following information SBAR, Kardex, ED Summary, Intake/Output, MAR and Recent Results    Shift worked:  [id number]     Shift summary and any significant changes:     Pt agreed to remain on HHF and later on in the shift she agreed to wear the NRB on top of the HHF. Concerns for physician to address:       Zone phone for oncoming shift:          Activity:  Activity Level: Bed Rest  Number times ambulated in hallways past shift: 0  Number of times OOB to chair past shift: 0    Cardiac:   Cardiac Monitoring: Yes      Cardiac Rhythm: Sinus Rhythm    Access:   Current line(s): PIV     Genitourinary:   Urinary status: voiding and external catheter    Respiratory:   O2 Device: Heated,Hi flow nasal cannula  Chronic home O2 use?: NO  Incentive spirometer at bedside: NO       GI:  Last Bowel Movement Date: 03/14/22  Current diet:  DIET ONE TIME MESSAGE  ADULT ORAL NUTRITION SUPPLEMENT Breakfast, Dinner; Renal Supplement  ADULT DIET Easy to Chew; 4 carb choices (60 gm/meal); Chop all meats to make it easier to chew;any flavor Nepro shake  Passing flatus: YES  Tolerating current diet: YES       Pain Management:   Patient states pain is manageable on current regimen: YES    Skin:  Mike Score: 15  Interventions: speciality bed, float heels, increase time out of bed, PT/OT consult and internal/external urinary devices    Patient Safety:  Fall Score:  Total Score: 5  Interventions: bed/chair alarm, gripper socks, pt to call before getting OOB and stay with me (per policy)  High Fall Risk: Yes    Length of Stay:  Expected LOS: 5d 9h  Actual LOS: [facility]

## 2022-03-16 NOTE — PROGRESS NOTES
07- Report given to Carlos Rick, RN and Urmila Iglesias, RN by off going nurse. 09- Spoke with Josie/MARIETTA re: tachypnea and labored breathing. Orders received for morphine and ativan. See MAR.     6311- Pt's family members and friend Vinita Stahl finished visiting. Confirmed with MARIETTA Fitzpatrick who confirmed its ok to remove HHF.    1800- Pt medicated with PRN ativan and morphine. See MAR. Pt placed on 2 L NC for comfort. - Pt . Dr. Eduardo Tellez, nursing supervisor and  notified. Voice mail left with [employer]. Sisters at bedside.

## 2022-03-16 NOTE — PROGRESS NOTES
Pulmonary, Critical Care, and Sleep Medicine    Name: Kaycee Berkowitz MRN: [id number]   : 1950 Hospital: [facility]   Date: 3/16/2022        IMPRESSION:   · Acute Hypoxic Respiratory Failure  · ARDS  · Covid 19 Pneumonia   · Type 2 DM  · Hypertension      RECOMMENDATIONS:   · HFNC at maximal dosing - still hypoxic, but she is refusing any type of face mask (or intubation)  · Would extend course of steroids  · Olumiant completed  · On Enoxaparin  · Glycemic control. · Palliative care following  · Poor prognosis. High risk for further decline  · DNR  · Discussed with nursing      Subjective:     3-16-22: remains critically ill with SpO2 in 60s and 70s. Occasionally willing to wear a NRB, but SpO2 remains quite low. 3-15-22: saturation ranging from high 60s to 80% on HFNC. Refusing masks and has made it clear that is her desire (no BiPAP, NRB, etc). Lethargic at times. 3-14-22: desaturating into the 80s on HFNC and occasionally NRB. Has refused NIV and frequently refuses NRB. She was sleeping on my evaluation and I did not wake her.  3/12/22 : Resting in bed. Sister at bedside. No sig changes. CXR w/ unchanged evan asdz. 3-11-22: Pt has been feeling about the same but has worsening desaturation. Had to be placed on bipap this am. She denies any issues with aspiration. NO chest pain, no back pain. NO headache.   3-10-22: NO chest pain, no back pain. NO headaches. She feels like she is making progress. Sleeping well. NO fevers. 3-9-22: PT  Is alert and interactive this am. She is on high flow NC oxygen. NO chest pain, she has mild lower back pain, no leg pain, difficult to move in her bed. She feels that her breathing is comfortable. NO sinus pain or pressure. Has been tolerating the High flow NC. Tolerated eating her breakfast well this am.         This patient has been seen and evaluated at the request of Dr. Darby Fleischer for above.  Patient is a 66 y.o. female had difficulty with sleeping. Has a dry cough. NO headache. NO sinus pain or pressure. NO GERD. Lying in bed. She was ready to eat this am.   Feels fatigued.        Past Medical History:   Diagnosis Date    Diabetes ([address]     Hypercholesterolemia     Hypertension       Past Surgical History:   Procedure Laterality Date    HX GYN      HX HYSTERECTOMY N/A       Prior to Admission medications    Not on File     No Known Allergies   Social History     Tobacco Use    Smoking status: Former Smoker     Packs/day: 0.50     Years: 4.00     Pack years: 2.00     Quit date: 1975     Years since quittin.8    Smokeless tobacco: Never Used   Substance Use Topics    Alcohol use: No      Family History   Problem Relation Age of Onset    Cancer Mother     Diabetes Mother     Heart Disease Mother     Diabetes Sister     Diabetes Sister     Diabetes Sister     Diabetes Sister     Cancer Sister         Current Facility-Administered Medications   Medication Dose Route Frequency    [Held by provider] insulin NPH (NOVOLIN N, HUMULIN N) injection 10 Units  10 Units SubCUTAneous ACB&D    balsam peru-castor oiL (VENELEX) ointment   Topical BID    aspirin chewable tablet 81 mg  81 mg Oral DAILY    [Held by provider] lisinopriL (PRINIVIL, ZESTRIL) tablet 10 mg  10 mg Oral DAILY    [Held by provider] metoprolol succinate (TOPROL-XL) XL tablet 50 mg  50 mg Oral DAILY    atorvastatin (LIPITOR) tablet 10 mg  10 mg Oral DAILY    sodium chloride (NS) flush 5-40 mL  5-40 mL IntraVENous Q8H    enoxaparin (LOVENOX) injection 40 mg  40 mg SubCUTAneous DAILY    insulin lispro (HUMALOG) injection   SubCUTAneous AC&HS       Objective:   Vital Signs:    Visit Vitals  BP (!) 89/54 (BP 1 Location: Right upper arm, BP Patient Position: At rest)   Pulse 88   Temp 97.7 °F (36.5 °C)   Resp (!) 37   Ht 5' (1.524 m)   Wt 62.2 kg (137 lb 1.6 oz)   SpO2 (!) 73%   Breastfeeding No   BMI 26.78 kg/m²       O2 Device: Heated,Hi flow nasal cannula   O2 Flow Rate (L/min): 60 l/min   Temp (24hrs), Av.1 °F (36.7 °C), Min:97.2 °F (36.2 °C), Max:98.9 °F (37.2 °C)       Intake/Output:   Last shift:      No intake/output data recorded. Last 3 shifts:  1901 -  0700  In: 200 [P.O.:200]  Out: 320 [Urine:320]    Intake/Output Summary (Last 24 hours) at 3/16/2022 0916  Last data filed at 3/15/2022 1816  Gross per 24 hour   Intake 200 ml   Output    Net 200 ml      Physical Exam:   General:  Ill appearing, on HFNC   Head:  Normocephalic, without obvious abnormality, atraumatic. Eyes:  Conjunctivae/corneas clear. Nose: Nares normal. Septum midline. Mucosa normal.     Throat: Lips, mucosa, and tongue normal.    Neck: Supple, symmetrical, trachea midline    Back:   Symmetric, no curvature. ROM normal.   Lungs:   Distant bilateral breath sounds   Chest wall:  No tenderness or deformity. Heart:  Regular rate and rhythm    Abdomen:   Soft, non-tender. Bowel sounds normal.    Extremities: Extremities normal, atraumatic    Skin: Skin color, texture, turgor normal. No rashes or lesions   Neurologic: Grossly nonfocal       Data:   Labs:  No results for input(s): WBC, HGB, HCT, PLT, HGBEXT, HCTEXT, PLTEXT, HGBEXT, HCTEXT, PLTEXT in the last 72 hours. No results for input(s): NA, K, CL, CO2, GLU, BUN, CREA, CA, MG, PHOS, ALB, TBIL, TBILI, ALT, INR, INREXT, INREXT in the last 72 hours. No lab exists for component: SGOT  No results for input(s): PHI, PCO2I, PO2I, HCO3I, FIO2I in the last 72 hours.     Imaging:  I have personally reviewed the patients radiographs:  None today        Genesis Patel MD

## 2022-03-16 NOTE — PROGRESS NOTES
Palliative Medicine Consult  Yogesh: [phone] ([phone])    Patient Name: Michelle Molina  YOB: 1950    Date of Initial Consult: 3/11/22  Reason for Consult: Care Decisions  Requesting Provider: Tg Paul MD  Primary Care Physician: None     SUMMARY:   Michelle Molina is a 66 y.o. with a past history of DM, HTN, HLD, who was admitted on 3/1/2022 from home with a diagnosis of acute hypoxic respiratory failure and suspected acute pulmonary edema. After admission she was tested for COVID and it resulted positive. She has been maintaining on high-flow around 50LPM at 70% FI02, but over the last 24 hours has been declining slowly. Today she really started to decompensate, and when I arrive she was so anxious and upset which was likely contributing to her low sats as well  Up until today she was able to eat and drink, although yesterday was less intake then the day before     PALLIATIVE DIAGNOSES:   1. Goals of care  2. Anorexia  3. Shortness of breath  4. Frailty  5. palliative     PLAN:   1. Mrs Rose Matias is responding the morphine well- I adjusted her orders, stopped her PRN meds as she is no longer able to take them, and we are making the slow transition to comfort. Will leave high flow in place for now, as she is comfortable, and family lives pretty far away. 2. Later this afternoon, if she remains generally unresponsive, or progressively hypoxic, or requiring PRN meds, will make the full transition to comfort  3. Viri aware, and is calling her sisters  4. Initial consult note routed to primary continuity provider and/or primary health care team members  5. Communicated plan of care with: Palliative [facility] J[facility] DRAPER 192 Team    Addendum 1437:  Mrs Rose Matias appears comfortable with current dose of morphine, but she is requiring morphine to help her work of breathing.   She remains unresponsive for us, and her sats are in the 50's on 60LPM of heated highflow  I attempted to call Viri to update her- I think its time to stop the aggressive oxygen as it is just serving to prolong the dying process, but I had to leave a message. Will try again in a little bit    Addendum:2:30- spoke with Viri- she understands that we are prolonging death at this point and is on board with stopping the highflow and letting her pass, but she has one more sister coming today who should be here by Ann Marie Alvaradopilar is coming as well, and she is giving her son one more chance to see her if he wants to as well. Once everyone has had a chance to say goodbye, will stop the highflow and allow her to pass     GOALS OF CARE / TREATMENT PREFERENCES:     GOALS OF CARE:  Patient/Health Care Proxy Stated Goals: Comfort    TREATMENT PREFERENCES:   Code Status: DNR    Advance Care Planning:  [x] [facility] Interdisciplinary Team has updated the ACP Navigator with [address] and Patient Capacity      Primary Decision Maker (Active): Benita Peralta - Other Non-relative - [phone]    Medical Interventions: Limited additional interventions       Other:    As far as possible, the palliative care team has discussed with patient / health care proxy about goals of care / treatment preferences for patient.      HISTORY:     History obtained from: chart, patient    CHIEF COMPLAINT: none    HPI/SUBJECTIVE:    The patient is:   [] Verbal and participatory  [x] Non-participatory due to:     Clinical Pain Assessment (nonverbal scale for severity on nonverbal patients):   Clinical Pain Assessment  Severity: 0          Duration: for how long has pt been experiencing pain (e.g., 2 days, 1 month, years)  Frequency: how often pain is an issue (e.g., several times per day, once every few days, constant)     FUNCTIONAL ASSESSMENT:     Palliative Performance Scale (PPS):  PPS: 30       PSYCHOSOCIAL/SPIRITUAL SCREENING:     Palliative IDT has assessed this patient for cultural preferences / practices and a referral made as appropriate to needs (Cultural Services, Patient Advocacy, Ethics, etc.)    Any spiritual / Jew concerns:  [] Yes /  [x] No   If \"Yes\" to discuss with pastoral care during IDT     Does caregiver feel burdened by caring for their loved one:   [] Yes /  [x] No /  [] No Caregiver Present    If \"Yes\" to discuss with social work during IDT    Anticipatory grief assessment:   [x] Normal  / [] Maladaptive     If \"Maladaptive\" to discuss with social work during IDT    ESAS Anxiety: Anxiety: 0    ESAS Depression: Depression: 0        REVIEW OF SYSTEMS:     Positive and pertinent negative findings in ROS are noted above in HPI. The following systems were [x] reviewed / [] unable to be reviewed as noted in HPI  Other findings are noted below. Systems: constitutional, ears/nose/mouth/throat, respiratory, gastrointestinal, genitourinary, musculoskeletal, integumentary, neurologic, psychiatric, endocrine. Positive findings noted below. Modified ESAS Completed by: provider   Fatigue: 10     Depression: 0 Pain: 0   Anxiety: 0 Nausea: 0   Anorexia: 10 Dyspnea: 0           Stool Occurrence(s): 1        PHYSICAL EXAM:     From RN flowsheet:  Wt Readings from Last 3 Encounters:   03/10/22 137 lb 1.6 oz (62.2 kg)   02/23/22 148 lb 15.8 oz (67.6 kg)   07/14/20 150 lb (68 kg)     Blood pressure (!) 89/54, pulse 88, temperature 97.7 °F (36.5 °C), resp. rate (!) 37, height 5' (1.524 m), weight 137 lb 1.6 oz (62.2 kg), SpO2 (!) 75 %, not currently breastfeeding.     Pain Scale 1: Numeric (0 - 10)  Pain Intensity 1: 0  Pain Onset 1: acute  Pain Location 1: Back  Pain Orientation 1: Mid  Pain Description 1: Aching  Pain Intervention(s) 1: Medication (see MAR)  Last bowel movement, if known:     Constitutional: not responsive at this time  Eyes: pupils equal, anicteric  ENMT: dried membranes, ervin in her nose  Cardiovascular: regular rhythm, distal pulses intact  Respiratory: breathing not labored, symmetric  Gastrointestinal: soft non-tender, +bowel sounds  Musculoskeletal: no deformity, no tenderness to palpation  Skin: warm, dry  Other:       HISTORY:     Active Problems:    Acute pulmonary edema (HCC) (3/1/2022)      Goals of care, counseling/discussion ()      Anxiety ()      Shortness of breath ()      Frailty ()      Palliative care by specialist ()      Anorexia ()      Past Medical History:   Diagnosis Date    Diabetes ([facility] [address]     Hypercholesterolemia     Hypertension       Past Surgical History:   Procedure Laterality Date    HX GYN      HX HYSTERECTOMY N/A       Family History   Problem Relation Age of Onset    Cancer Mother     Diabetes Mother     Heart Disease Mother     Diabetes Sister     Diabetes Sister     Diabetes Sister     Diabetes Sister     Cancer Sister       History reviewed, no pertinent family history.   Social History     Tobacco Use    Smoking status: Former Smoker     Packs/day: 0.50     Years: 4.00     Pack years: 2.00     Quit date: 1975     Years since quittin.8    Smokeless tobacco: Never Used   Substance Use Topics    Alcohol use: No     No Known Allergies   Current Facility-Administered Medications   Medication Dose Route Frequency    methylPREDNISolone (PF) (SOLU-MEDROL) injection 40 mg  40 mg IntraVENous Q8H    morphine injection 1 mg  1 mg IntraVENous Q1H PRN    LORazepam (ATIVAN) injection 1 mg  1 mg IntraVENous Q1H PRN    balsam peru-castor oiL (VENELEX) ointment   Topical BID    sodium chloride (OCEAN) 0.65 % nasal squeeze bottle 2 Spray  2 Spray Both Nostrils Q2H PRN    sodium chloride (NS) flush 5-40 mL  5-40 mL IntraVENous Q8H    sodium chloride (NS) flush 5-40 mL  5-40 mL IntraVENous PRN          LAB AND IMAGING FINDINGS:     Lab Results   Component Value Date/Time    WBC 13.7 (H) 2022 05:24 AM    HGB 12.2 2022 05:24 AM    PLATELET 306 (H) 10/69/7153 05:24 AM     Lab Results   Component Value Date/Time    Sodium 135 (L) 2022 05:24 AM    Potassium 4.7 2022 05:24 AM Chloride 102 03/13/2022 05:24 AM    CO2 27 03/13/2022 05:24 AM    BUN 30 (H) 03/13/2022 05:24 AM    Creatinine 0.57 03/13/2022 05:24 AM    Calcium 8.6 03/13/2022 05:24 AM    Magnesium 1.8 08/14/2009 04:15 AM      Lab Results   Component Value Date/Time    Alk. phosphatase 120 (H) 03/01/2022 02:20 PM    Protein, total 7.6 03/01/2022 02:20 PM    Albumin 2.6 (L) 03/01/2022 02:20 PM    Globulin 5.0 (H) 03/01/2022 02:20 PM     No results found for: INR, PTMR, PTP, PT1, PT2, APTT, INREXT, INREXT   No results found for: IRON, FE, TIBC, IBCT, PSAT, FERR   No results found for: PH, PCO2, PO2  No components found for: [place]   Lab Results   Component Value Date/Time    CK 41 08/12/2009 05:15 PM    CK - MB 1.0 08/12/2009 05:15 PM                Total time: 35m  Counseling / coordination time, spent as noted above: 30m  > 50% counseling / coordination?: y    Prolonged service was provided for  []30 min   []75 min in face to face time in the presence of the patient, spent as noted above. Time Start:   Time End:   Note: this can only be billed with 39381 (initial) or 50648 (follow up). If multiple start / stop times, list each separately.

## 2022-03-16 NOTE — PROGRESS NOTES
03/16/22 0808   Vitals   Temp 97.7 °F (36.5 °C)   Temp Source Axillary   Pulse (Heart Rate) 88   Heart Rate Source Monitor   Resp Rate (!) 37   O2 Sat (%) (!) 73 %   Level of Consciousness Responds to Voice (1)   BP (!) 89/54   MAP (Calculated) 66   BP 1 Location Right upper arm   BP 1 Method Automatic   BP Patient Position At rest   Cardiac Rhythm Sinus Rhythm   MEWS Score 4   Pain 1   Pain Scale 1 Numeric (0 - 10)   Pain Intensity 1 0   POSS Scale   Opioid Sedation Scale 3   Oxygen Therapy   O2 Device Heated; Hi flow nasal cannula   O2 Flow Rate (L/min) 60 l/min   FIO2 (%) 100 %   MEWS elevated d/t tachypnea and hypotension. Pt denies feeling SOB or any distress. Pt resting comfortably. MD aware.

## 2022-03-16 NOTE — PROGRESS NOTES
Spiritual Care Assessment/Progress Note  [facility]      NAME: Sha Rubi      MRN: [id number]  AGE: 66 y.o.  SEX: female  Judaism Affiliation: No Gnosticist   Language: English     3/16/2022     Total Time (in minutes): 26     Spiritual Assessment begun in [facility] PROGRESSIVE CARE through conversation with:         [x]Patient        [] Family    [x] Friend(s)        Reason for Consult: Death, Inpatient     Spiritual beliefs: (Please include comment if needed)     [x] Identifies with a thomas tradition:  Jainism         [] Supported by a thomas community:            [] Claims no spiritual orientation:           [] Seeking spiritual identity:                [] Adheres to an individual form of spirituality:           [] Not able to assess:                           Identified resources for coping:      [] Prayer                               [] Music                  [] Guided Imagery     [x] Family/friends                 [] Pet visits     [] Devotional reading                         [] Unknown     [] Other:                                              Interventions offered during this visit: (See comments for more details)    Patient Interventions: Initial visit     Family/Friend(s): Coping skills reviewed/reinforced,Normalization of emotional/spiritual concerns,Prayer (assurance of),End of life issues discussed,Affirmation of thomas,Catharsis/review of pertinent events in supportive environment,Judaism beliefs/image of God discussed     Plan of Care:     [] Support spiritual and/or cultural needs    [] Support AMD and/or advance care planning process      [] Support grieving process   [] Coordinate Rites and/or Rituals    [] Coordination with community clergy   [] No spiritual needs identified at this time   [] Detailed Plan of Care below (See Comments)  [] Make referral to Music Therapy  [] Make referral to Pet Therapy     [] Make referral to Addiction services  [] Make referral to [facility] Passages  [] Make referral to Spiritual Care Partner  [x] No future visits requested        [] Contact Spiritual Care for further referrals     Comments:  [address] responded to death of patient, Miss Mable Hancock on [facility] Care unit. Called best friend and primary decision maker, Viri. Offered grief support through empathetic listening and affirmation. She shared and processed her thoughts and feelings about current situation. She shared how they connected some 36 years ago and remained a family to date. Viri shared patient has an only son but no grand children and so her children are like grand children to patient. Patient is a person of thomas, and she told Viri and other friends and family that she was at peace and ready to go be with the [address]  offered condolences and spoke clm words of comfort for support. Viri expressed appreciation for the call. Visited by: Janee Mcgregor.    Paging Service: [phone])

## 2022-03-16 NOTE — PROGRESS NOTES
Brief Nutrition Note    3/16: Chart reviewed; noted pt is transitioning to comfort care. Pt continues on an oral diet at this time. RD discontinued oral nutritional supplements. No plans for aggressive nutrition intervention; RD will sign-off.     Electronically signed by Henrietta Barnes RD on 3/16/2022 at 3:23 PM    Contact:

## 2022-03-16 NOTE — PROGRESS NOTES
Palliative Medicine  [phone]    Received call from staff that patient is starting to get symptomatic with tachypnea  Ordered PRN morphine and ill check in in a little bit  I called Viri to let her know that she was starting to breath harder, and I didn't know what the day would bring but that I would work to ensure that she remained comfortable.   I recommended that they come to see her if they want and I do not know if this is the beginning of the end    Will address full [facility] later today    Barbara Jenkins NP

## 2022-03-16 NOTE — PROGRESS NOTES
Hospitalist Progress Note    NAME: Deion Hollis   :  1950   MRN:  [id number]       Assessment / Plan:  Acute hypoxic respiratory failure due to  COVID-19 Pneumonia POA  Hypertension  -Coming with shortness of breath, cough and phlegm  -CT shows evidence of pulmonary edema versus infection  -Rapid COVID-19 test is positive, procal low.    -Continue dexamethasone, completed a course of baricitinib  -Continue to hold antihypertensive medications due to low bp  -she remains hypoxic with O2 sats as low as 60% on 60LHF. Pt's goals are clear-NO MASK. No escalation per pt's request  -Appreciated palliative care consult, transitioning to comfort measures. Diabetes mellitus type 2  -Hemoglobin A1c is 11.9  -BG low requiring treatment. This is due to poor po intake. NPH was stopped, continue SSI. Adjust prn. Stop SSI when she is fully comfort measures     Hypertension  Dyslipidemia  -hold PO meds     Code Status:DNR with no escalation of current level of treatment  Surrogate Decision Maker: pt's friend Viri  DVT Prophylaxis: Lovenox  GI Prophylaxis: not indicated     Baseline: From home, independent of ADLs    Recommended Disposition: Home w/Family     Subjective:     Chief Complaint / Reason for Physician Visit  Lethargic. Hypoxic. Review of Systems:  Symptom Y/N Comments  Symptom Y/N Comments   Fever/Chills n   Chest Pain n    Poor Appetite n   Edema n    Cough n   Abdominal Pain n    Sputum n   Joint Pain     SOB/SALAS n   Pruritis/Rash     Nausea/vomit n   Tolerating PT/OT     Diarrhea n   Tolerating Diet     Constipation n   Other       Could NOT obtain due to:      Objective:     VITALS:   Last 24hrs VS reviewed since prior progress note.  Most recent are:  Patient Vitals for the past 24 hrs:   Temp Pulse Resp BP SpO2   22 1238     (!) 60 %   22 1113 97.8 °F (36.6 °C) 81 28 103/60 (!) 63 %   22 1025   (!) 34  (!) 71 %   22 0900     (!) 75 %   22 0808 97.7 °F (36.5 °C) 88 (!) 37 (!) 89/54 (!) 73 %   03/16/22 0607     (!) 73 %   03/16/22 0445 98.9 °F (37.2 °C) 92 28 (!) 98/52 (!) 70 %   03/16/22 0413     (!) 70 %   03/16/22 0018     (!) 50 %   03/15/22 2234 98.5 °F (36.9 °C) 92 23 (!) 123/58 (!) 42 %   03/15/22 2012     (!) 68 %   03/15/22 2000 98.3 °F (36.8 °C) 83 (!) 35 113/62 (!) 67 %   03/15/22 1556     (!) 80 %   03/15/22 1436 97.2 °F (36.2 °C) 76 (!) 34 90/62 (!) 83 %       Intake/Output Summary (Last 24 hours) at 3/16/2022 1240  Last data filed at 3/16/2022 0930  Gross per 24 hour   Intake 250 ml   Output    Net 250 ml        PHYSICAL EXAM:  General: Resting in bed, ill appearing  EENT:  EOMI. Anicteric sclerae. MMM  Resp:   No accessory muscle use  CV:  Regular  rhythm,  No edema  GI:  Soft, Non distended, Non tender.  +BS  Neurologic:  Not anxious currently  Skin:  No rashes. No jaundice    Reviewed most current lab test results and cultures  YES  Reviewed most current radiology test results   YES  Review and summation of old records today    NO  Reviewed patient's current orders and MAR    YES  PMH/ reviewed - no change compared to H&P  ________________________________________________________________________  Care Plan discussed with:    Comments   Patient     Family      RN x    Care Manager     x                      x Multidiciplinary team rounds were held today with , nursing, pharmacist and clinical coordinator. Patient's plan of care was discussed; medications were reviewed and discharge planning was addressed.      ________________________________________________________________________  Total NON critical care TIME:  30   Minutes    Total CRITICAL CARE TIME Spent:   Minutes non procedure based      Comments   >50% of visit spent in counseling and coordination of care     ________________________________________________________________________  Sylvester Rios MD     Procedures: see electronic medical records for all procedures/Xrays and details which were not copied into this note but were reviewed prior to creation of Plan. LABS:  I reviewed today's most current labs and imaging studies. Pertinent labs include:  No results for input(s): WBC, HGB, HCT, PLT, HGBEXT, HCTEXT, PLTEXT, HGBEXT, HCTEXT, PLTEXT in the last 72 hours. No results for input(s): NA, K, CL, CO2, GLU, BUN, CREA, CA, MG, PHOS, ALB, TBIL, TBILI, ALT, INR, INREXT, INREXT in the last 72 hours.     No lab exists for component: SGOT    Signed: Joel Lee MD

## 2022-03-16 NOTE — DEATH NOTE
Hospitalist death note    Called to see patient for death pronouncement  Unresponsive to verbal or painful stimuli  No respiratory effort or breath sounds x 1 minute  No pulse or heart sounds x 1 minute    Patient pronounced dead  Family in room    Felicity Sever, MD

## 2022-03-17 NOTE — DISCHARGE SUMMARY
Discharge Summary      Name: Fifi Jo  [id number]  YOB: 1950 (Age: 66 y.o.)   Date of Admission: 3/1/2022  Date of Discharge: 3/17/2022  Attending Physician: No att. providers found    Discharge Diagnosis:   Acute hypoxic respiratory failure due to  COVID-19 Pneumonia POA  Hypertension  Diabetes mellitus type 2  Hypertension  Dyslipidemia    Consultations:  IP CONSULT TO PULMONOLOGY    Brief Admission History/Reason for Admission Per Rhianna Segovia MD:   Fifi Jo is a 66 y.o. female who presents with past medical history of diabetes mellitus, hypertension, dyslipidemia is coming the hospital chief complaints of generalized weakness, shortness of breath, cough and phlegm. Patient reportedly noticed a fentanyl about 6 days ago when he started having shortness of breath along with cough and small amount of whitish phlegm. She also reports generalized weakness. She also reports fall yesterday and was on the floor for close to 6 to 7 hours. Does not report any fever or chills.     On arrival to ED, noted to have hypoxemia with saturations of 80% on room air. On labs noted to have a proBNP of 538. Troponin 49. LFTs are normal.  Creatinine is normal.  CBC is normal.  CTA chest shows evidence of bilateral groundglass opacification due to edema pneumonia.     We were asked to admit for work up and evaluation of the above problems. Brief Hospital Course by Main Problems:   Acute hypoxic respiratory failure due to  COVID-19 Pneumonia POA  Hypertension  Diabetes mellitus type 2  Hypertension  Dyslipidemia  Pt presented with shortness of breath, cough and phlegm. CT shows evidence of pulmonary edema versus infection. Rapid COVID-19 test is positive, procal low. Pt was treated with baricitinib and IV decadron. She required hiflow and bipap. No improvement since admission. Pt's goals are clear, declined any mask and no escalation of care per pt's wish. Despite all the treatment, pt did not improved. Pulmonary and palliative evaluated. Pt was made comfort care on 3/16 due to hypoxia on max HF O2. Pt  on 3/16.